# Patient Record
Sex: MALE | Race: WHITE | HISPANIC OR LATINO
[De-identification: names, ages, dates, MRNs, and addresses within clinical notes are randomized per-mention and may not be internally consistent; named-entity substitution may affect disease eponyms.]

---

## 2017-06-12 ENCOUNTER — PRIOR ORIGINAL RECORDS (OUTPATIENT)
Dept: OTHER | Age: 62
End: 2017-06-12

## 2017-07-17 ENCOUNTER — HOSPITAL ENCOUNTER (OUTPATIENT)
Dept: CV DIAGNOSTICS | Facility: HOSPITAL | Age: 62
Discharge: HOME | End: 2017-07-17
Attending: INTERNAL MEDICINE

## 2017-07-17 ENCOUNTER — MYAURORA ACCOUNT LINK (OUTPATIENT)
Dept: OTHER | Age: 62
End: 2017-07-17

## 2017-07-17 DIAGNOSIS — I42.0 CARDIOMYOPATHY, DILATED (HCC): ICD-10-CM

## 2017-07-17 DIAGNOSIS — I10 BENIGN HYPERTENSION: ICD-10-CM

## 2017-07-19 ENCOUNTER — PRIOR ORIGINAL RECORDS (OUTPATIENT)
Dept: OTHER | Age: 62
End: 2017-07-19

## 2018-09-13 ENCOUNTER — PRIOR ORIGINAL RECORDS (OUTPATIENT)
Dept: OTHER | Age: 63
End: 2018-09-13

## 2018-09-14 ENCOUNTER — PRIOR ORIGINAL RECORDS (OUTPATIENT)
Dept: OTHER | Age: 63
End: 2018-09-14

## 2018-10-06 ENCOUNTER — PRIOR ORIGINAL RECORDS (OUTPATIENT)
Dept: OTHER | Age: 63
End: 2018-10-06

## 2018-10-06 ENCOUNTER — LAB ENCOUNTER (OUTPATIENT)
Dept: LAB | Age: 63
End: 2018-10-06
Attending: INTERNAL MEDICINE
Payer: COMMERCIAL

## 2018-10-06 DIAGNOSIS — E78.00 PURE HYPERCHOLESTEROLEMIA: ICD-10-CM

## 2018-10-06 DIAGNOSIS — I10 ESSENTIAL HYPERTENSION, MALIGNANT: Primary | ICD-10-CM

## 2018-10-06 PROCEDURE — 80061 LIPID PANEL: CPT

## 2018-10-06 PROCEDURE — 36415 COLL VENOUS BLD VENIPUNCTURE: CPT

## 2018-10-06 PROCEDURE — 83735 ASSAY OF MAGNESIUM: CPT

## 2018-10-06 PROCEDURE — 80053 COMPREHEN METABOLIC PANEL: CPT

## 2018-10-09 ENCOUNTER — MYAURORA ACCOUNT LINK (OUTPATIENT)
Dept: OTHER | Age: 63
End: 2018-10-09

## 2018-10-09 ENCOUNTER — HOSPITAL ENCOUNTER (OUTPATIENT)
Dept: CV DIAGNOSTICS | Facility: HOSPITAL | Age: 63
Discharge: HOME | End: 2018-10-09
Attending: INTERNAL MEDICINE

## 2018-10-09 DIAGNOSIS — I47.2 VENTRICULAR TACHYARRHYTHMIA (HCC): ICD-10-CM

## 2018-10-10 ENCOUNTER — PRIOR ORIGINAL RECORDS (OUTPATIENT)
Dept: OTHER | Age: 63
End: 2018-10-10

## 2018-10-24 ENCOUNTER — MYAURORA ACCOUNT LINK (OUTPATIENT)
Dept: OTHER | Age: 63
End: 2018-10-24

## 2018-10-24 ENCOUNTER — PRIOR ORIGINAL RECORDS (OUTPATIENT)
Dept: OTHER | Age: 63
End: 2018-10-24

## 2018-10-25 LAB
ALBUMIN: 4.1 G/DL
ALKALINE PHOSPHATATE(ALK PHOS): 44 IU/L
BILIRUBIN TOTAL: 1.3 MG/DL
BUN: 19 MG/DL
CALCIUM: 8.8 MG/DL
CHLORIDE: 105 MEQ/L
CHOLESTEROL, TOTAL: 187 MG/DL
CREATININE, SERUM: 0.8 MG/DL
GLOBULIN: 3.5 G/DL
GLUCOSE: 87 MG/DL
HDL CHOLESTEROL: 57 MG/DL
LDL CHOLESTEROL: 117 MG/DL
MAGNESIUM: 2 MG/DL
POTASSIUM, SERUM: 4.1 MEQ/L
PROTEIN, TOTAL: 7.6 G/DL
SGOT (AST): 19 IU/L
SGPT (ALT): 29 IU/L
SODIUM: 137 MEQ/L
TRIGLYCERIDES: 66 MG/DL

## 2019-02-20 ENCOUNTER — PRIOR ORIGINAL RECORDS (OUTPATIENT)
Dept: OTHER | Age: 64
End: 2019-02-20

## 2019-02-21 ENCOUNTER — ORDER TRANSCRIPTION (OUTPATIENT)
Dept: ADMINISTRATIVE | Facility: HOSPITAL | Age: 64
End: 2019-02-21

## 2019-02-21 DIAGNOSIS — I42.9 CARDIOMYOPATHY (HCC): ICD-10-CM

## 2019-02-21 DIAGNOSIS — R06.02 SOB (SHORTNESS OF BREATH): Primary | ICD-10-CM

## 2019-02-22 ENCOUNTER — ORDER TRANSCRIPTION (OUTPATIENT)
Dept: ADMINISTRATIVE | Facility: HOSPITAL | Age: 64
End: 2019-02-22

## 2019-02-22 DIAGNOSIS — I42.9 CARDIOMYOPATHY (HCC): ICD-10-CM

## 2019-02-22 DIAGNOSIS — R06.02 SOB (SHORTNESS OF BREATH): Primary | ICD-10-CM

## 2019-02-28 VITALS
HEIGHT: 73 IN | BODY MASS INDEX: 32.07 KG/M2 | RESPIRATION RATE: 14 BRPM | DIASTOLIC BLOOD PRESSURE: 102 MMHG | HEART RATE: 90 BPM | SYSTOLIC BLOOD PRESSURE: 160 MMHG | WEIGHT: 242 LBS

## 2019-02-28 VITALS
DIASTOLIC BLOOD PRESSURE: 94 MMHG | HEART RATE: 84 BPM | BODY MASS INDEX: 32.2 KG/M2 | HEIGHT: 73 IN | SYSTOLIC BLOOD PRESSURE: 156 MMHG | WEIGHT: 243 LBS

## 2019-02-28 VITALS — SYSTOLIC BLOOD PRESSURE: 166 MMHG | DIASTOLIC BLOOD PRESSURE: 108 MMHG | HEART RATE: 70 BPM

## 2019-03-01 VITALS
DIASTOLIC BLOOD PRESSURE: 108 MMHG | HEIGHT: 73 IN | SYSTOLIC BLOOD PRESSURE: 148 MMHG | HEART RATE: 90 BPM | WEIGHT: 261 LBS | BODY MASS INDEX: 34.59 KG/M2

## 2019-03-04 ENCOUNTER — LAB ENCOUNTER (OUTPATIENT)
Dept: LAB | Age: 64
End: 2019-03-04
Attending: INTERNAL MEDICINE
Payer: COMMERCIAL

## 2019-03-04 DIAGNOSIS — Z45.02: ICD-10-CM

## 2019-03-04 DIAGNOSIS — I42.0 CONGESTIVE CARDIOMYOPATHY (HCC): Primary | ICD-10-CM

## 2019-03-04 DIAGNOSIS — R06.00 DYSPNEA, PAROXYSMAL NOCTURNAL: ICD-10-CM

## 2019-03-04 LAB
ANION GAP SERPL CALC-SCNC: 6 MMOL/L (ref 0–18)
BUN BLD-MCNC: 17 MG/DL (ref 7–18)
BUN/CREAT SERPL: 16.3 (ref 10–20)
CALCIUM BLD-MCNC: 9.2 MG/DL (ref 8.5–10.1)
CHLORIDE SERPL-SCNC: 104 MMOL/L (ref 98–107)
CO2 SERPL-SCNC: 29 MMOL/L (ref 21–32)
CREAT BLD-MCNC: 1.04 MG/DL (ref 0.7–1.3)
GLUCOSE BLD-MCNC: 88 MG/DL (ref 70–99)
HAV IGM SER QL: 2.1 MG/DL (ref 1.6–2.6)
OSMOLALITY SERPL CALC.SUM OF ELEC: 289 MOSM/KG (ref 275–295)
POTASSIUM SERPL-SCNC: 4.2 MMOL/L (ref 3.5–5.1)
SODIUM SERPL-SCNC: 139 MMOL/L (ref 136–145)
TSI SER-ACNC: 2.74 MIU/ML (ref 0.36–3.74)

## 2019-03-04 PROCEDURE — 84443 ASSAY THYROID STIM HORMONE: CPT

## 2019-03-04 PROCEDURE — 83735 ASSAY OF MAGNESIUM: CPT

## 2019-03-04 PROCEDURE — 36415 COLL VENOUS BLD VENIPUNCTURE: CPT

## 2019-03-04 PROCEDURE — 80048 BASIC METABOLIC PNL TOTAL CA: CPT

## 2019-03-08 ENCOUNTER — HOSPITAL ENCOUNTER (OUTPATIENT)
Dept: CT IMAGING | Facility: HOSPITAL | Age: 64
Discharge: HOME | End: 2019-03-08
Attending: INTERNAL MEDICINE
Payer: COMMERCIAL

## 2019-03-08 ENCOUNTER — TELEPHONE (OUTPATIENT)
Dept: CARDIOLOGY | Age: 64
End: 2019-03-08

## 2019-03-08 DIAGNOSIS — I42.9 CARDIOMYOPATHY, SECONDARY (HCC): ICD-10-CM

## 2019-03-08 DIAGNOSIS — I42.9 CARDIOMYOPATHY (HCC): ICD-10-CM

## 2019-03-08 DIAGNOSIS — R06.02 SOB (SHORTNESS OF BREATH): ICD-10-CM

## 2019-03-08 PROCEDURE — 75574 CT ANGIO HRT W/3D IMAGE: CPT | Performed by: INTERNAL MEDICINE

## 2019-03-08 RX ORDER — NITROGLYCERIN 0.4 MG/1
TABLET SUBLINGUAL
Status: DISPENSED
Start: 2019-03-08 | End: 2019-03-09

## 2019-03-10 RX ORDER — SPIRONOLACTONE 25 MG/1
1 TABLET ORAL DAILY
COMMUNITY
Start: 2018-11-07 | End: 2019-08-03 | Stop reason: SDUPTHER

## 2019-03-10 RX ORDER — METOPROLOL TARTRATE 100 MG/1
TABLET ORAL
COMMUNITY
Start: 2019-02-20 | End: 2019-10-15 | Stop reason: ALTCHOICE

## 2019-03-10 RX ORDER — SOTALOL HYDROCHLORIDE 80 MG/1
1 TABLET ORAL 2 TIMES DAILY
COMMUNITY
Start: 2019-02-20 | End: 2019-08-03 | Stop reason: SDUPTHER

## 2019-03-10 RX ORDER — CARVEDILOL 25 MG/1
1 TABLET ORAL 2 TIMES DAILY
COMMUNITY
Start: 2018-11-07 | End: 2020-01-01 | Stop reason: SDUPTHER

## 2019-03-10 RX ORDER — LISINOPRIL 20 MG/1
1 TABLET ORAL 2 TIMES DAILY
COMMUNITY
Start: 2018-11-07 | End: 2020-01-01 | Stop reason: SDUPTHER

## 2019-03-10 RX ORDER — TADALAFIL 5 MG/1
TABLET ORAL
COMMUNITY
Start: 2018-11-07 | End: 2019-12-22 | Stop reason: SDUPTHER

## 2019-03-20 ENCOUNTER — OFFICE VISIT (OUTPATIENT)
Dept: CARDIOLOGY | Age: 64
End: 2019-03-20

## 2019-03-20 ENCOUNTER — ANCILLARY PROCEDURE (OUTPATIENT)
Dept: CARDIOLOGY | Age: 64
End: 2019-03-20
Attending: INTERNAL MEDICINE

## 2019-03-20 VITALS
SYSTOLIC BLOOD PRESSURE: 160 MMHG | BODY MASS INDEX: 34.27 KG/M2 | HEART RATE: 79 BPM | DIASTOLIC BLOOD PRESSURE: 96 MMHG | HEIGHT: 72 IN | WEIGHT: 253 LBS

## 2019-03-20 VITALS
WEIGHT: 253 LBS | DIASTOLIC BLOOD PRESSURE: 96 MMHG | HEIGHT: 72 IN | HEART RATE: 79 BPM | SYSTOLIC BLOOD PRESSURE: 160 MMHG | BODY MASS INDEX: 34.27 KG/M2

## 2019-03-20 DIAGNOSIS — I10 ESSENTIAL HYPERTENSION: ICD-10-CM

## 2019-03-20 DIAGNOSIS — I42.0 CARDIOMYOPATHY, DILATED, NONISCHEMIC (CMD): Primary | ICD-10-CM

## 2019-03-20 DIAGNOSIS — I47.20 VENTRICULAR TACHYCARDIA (CMD): Chronic | ICD-10-CM

## 2019-03-20 DIAGNOSIS — Z95.810 ICD (IMPLANTABLE CARDIOVERTER-DEFIBRILLATOR) IN PLACE: Primary | ICD-10-CM

## 2019-03-20 DIAGNOSIS — Z95.810 ICD (IMPLANTABLE CARDIOVERTER-DEFIBRILLATOR) IN PLACE: ICD-10-CM

## 2019-03-20 PROCEDURE — 99215 OFFICE O/P EST HI 40 MIN: CPT | Performed by: INTERNAL MEDICINE

## 2019-03-20 PROCEDURE — 3077F SYST BP >= 140 MM HG: CPT | Performed by: INTERNAL MEDICINE

## 2019-03-20 PROCEDURE — 93289 INTERROG DEVICE EVAL HEART: CPT | Performed by: INTERNAL MEDICINE

## 2019-03-20 PROCEDURE — 93000 ELECTROCARDIOGRAM COMPLETE: CPT | Performed by: INTERNAL MEDICINE

## 2019-03-20 PROCEDURE — 3080F DIAST BP >= 90 MM HG: CPT | Performed by: INTERNAL MEDICINE

## 2019-03-20 PROCEDURE — 99213 OFFICE O/P EST LOW 20 MIN: CPT | Performed by: INTERNAL MEDICINE

## 2019-03-20 SDOH — HEALTH STABILITY: MENTAL HEALTH: HOW OFTEN DO YOU HAVE A DRINK CONTAINING ALCOHOL?: NEVER

## 2019-03-20 ASSESSMENT — ENCOUNTER SYMPTOMS
CHILLS: 0
ALLERGIC/IMMUNOLOGIC COMMENTS: NO NEW FOOD ALLERGIES
HEMOPTYSIS: 0
SUSPICIOUS LESIONS: 0
HEMOPTYSIS: 0
COUGH: 0
HEMATOCHEZIA: 0
WEIGHT LOSS: 0
ALLERGIC/IMMUNOLOGIC COMMENTS: NO NEW FOOD ALLERGIES
FEVER: 0
BRUISES/BLEEDS EASILY: 0
COUGH: 0
HEMATOCHEZIA: 0
WEIGHT LOSS: 0
WEIGHT GAIN: 0
SUSPICIOUS LESIONS: 0
BRUISES/BLEEDS EASILY: 0
FEVER: 0
WEIGHT GAIN: 0
CHILLS: 0

## 2019-03-27 ENCOUNTER — TELEPHONE (OUTPATIENT)
Dept: CARDIOLOGY | Age: 64
End: 2019-03-27

## 2019-06-04 ENCOUNTER — APPOINTMENT (OUTPATIENT)
Dept: CARDIOLOGY | Age: 64
End: 2019-06-04
Attending: INTERNAL MEDICINE

## 2019-08-05 RX ORDER — SPIRONOLACTONE 25 MG/1
TABLET ORAL
Qty: 90 TABLET | Refills: 0 | Status: SHIPPED | OUTPATIENT
Start: 2019-08-05 | End: 2019-10-18 | Stop reason: SDUPTHER

## 2019-08-05 RX ORDER — SOTALOL HYDROCHLORIDE 80 MG/1
TABLET ORAL
Qty: 180 TABLET | Refills: 0 | Status: SHIPPED | OUTPATIENT
Start: 2019-08-05 | End: 2019-10-18 | Stop reason: SDUPTHER

## 2019-10-18 RX ORDER — SPIRONOLACTONE 25 MG/1
TABLET ORAL
Qty: 90 TABLET | Refills: 1 | Status: SHIPPED | OUTPATIENT
Start: 2019-10-18 | End: 2020-03-16

## 2019-10-18 RX ORDER — SOTALOL HYDROCHLORIDE 80 MG/1
TABLET ORAL
Qty: 60 TABLET | Refills: 0 | Status: SHIPPED | OUTPATIENT
Start: 2019-10-18 | End: 2020-04-15 | Stop reason: SDUPTHER

## 2019-12-08 ENCOUNTER — APPOINTMENT (OUTPATIENT)
Dept: GENERAL RADIOLOGY | Age: 64
DRG: 308 | End: 2019-12-08
Attending: EMERGENCY MEDICINE
Payer: COMMERCIAL

## 2019-12-08 ENCOUNTER — HOSPITAL ENCOUNTER (INPATIENT)
Facility: HOSPITAL | Age: 64
LOS: 2 days | Discharge: HOME | DRG: 308 | End: 2019-12-10
Attending: EMERGENCY MEDICINE | Admitting: INTERNAL MEDICINE
Payer: COMMERCIAL

## 2019-12-08 DIAGNOSIS — J18.9 COMMUNITY ACQUIRED PNEUMONIA OF RIGHT LUNG, UNSPECIFIED PART OF LUNG: Primary | ICD-10-CM

## 2019-12-08 DIAGNOSIS — I48.91 ATRIAL FIBRILLATION WITH RAPID VENTRICULAR RESPONSE (HCC): ICD-10-CM

## 2019-12-08 PROBLEM — R73.9 HYPERGLYCEMIA: Status: ACTIVE | Noted: 2019-12-08

## 2019-12-08 PROCEDURE — 99253 IP/OBS CNSLTJ NEW/EST LOW 45: CPT | Performed by: INTERNAL MEDICINE

## 2019-12-08 PROCEDURE — 99223 1ST HOSP IP/OBS HIGH 75: CPT | Performed by: INTERNAL MEDICINE

## 2019-12-08 PROCEDURE — 71046 X-RAY EXAM CHEST 2 VIEWS: CPT | Performed by: EMERGENCY MEDICINE

## 2019-12-08 RX ORDER — HEPARIN SODIUM AND DEXTROSE 10000; 5 [USP'U]/100ML; G/100ML
INJECTION INTRAVENOUS CONTINUOUS
Status: DISCONTINUED | OUTPATIENT
Start: 2019-12-08 | End: 2019-12-10

## 2019-12-08 RX ORDER — ASPIRIN 81 MG/1
324 TABLET, CHEWABLE ORAL ONCE
Status: COMPLETED | OUTPATIENT
Start: 2019-12-08 | End: 2019-12-08

## 2019-12-08 RX ORDER — LISINOPRIL 20 MG/1
20 TABLET ORAL DAILY
Status: DISCONTINUED | OUTPATIENT
Start: 2019-12-09 | End: 2019-12-10

## 2019-12-08 RX ORDER — ONDANSETRON 2 MG/ML
4 INJECTION INTRAMUSCULAR; INTRAVENOUS EVERY 6 HOURS PRN
Status: DISCONTINUED | OUTPATIENT
Start: 2019-12-08 | End: 2019-12-10

## 2019-12-08 RX ORDER — METOCLOPRAMIDE HYDROCHLORIDE 5 MG/ML
10 INJECTION INTRAMUSCULAR; INTRAVENOUS EVERY 8 HOURS PRN
Status: DISCONTINUED | OUTPATIENT
Start: 2019-12-08 | End: 2019-12-10

## 2019-12-08 RX ORDER — HEPARIN SODIUM 5000 [USP'U]/ML
5000 INJECTION INTRAVENOUS; SUBCUTANEOUS ONCE
Status: COMPLETED | OUTPATIENT
Start: 2019-12-08 | End: 2019-12-08

## 2019-12-08 RX ORDER — SPIRONOLACTONE 25 MG/1
25 TABLET ORAL DAILY
Status: DISCONTINUED | OUTPATIENT
Start: 2019-12-09 | End: 2019-12-10

## 2019-12-08 RX ORDER — SODIUM CHLORIDE 9 MG/ML
INJECTION, SOLUTION INTRAVENOUS CONTINUOUS
Status: ACTIVE | OUTPATIENT
Start: 2019-12-08 | End: 2019-12-08

## 2019-12-08 RX ORDER — SODIUM CHLORIDE 9 MG/ML
INJECTION, SOLUTION INTRAVENOUS ONCE
Status: COMPLETED | OUTPATIENT
Start: 2019-12-08 | End: 2019-12-08

## 2019-12-08 RX ORDER — HEPARIN SODIUM AND DEXTROSE 10000; 5 [USP'U]/100ML; G/100ML
1000 INJECTION INTRAVENOUS ONCE
Status: COMPLETED | OUTPATIENT
Start: 2019-12-08 | End: 2019-12-08

## 2019-12-08 NOTE — PROGRESS NOTES
Orthostatic vitals       12/08/19 1642 12/08/19 1644 12/08/19 1645   Vital Signs   /86 147/89 (!) 145/99   BP Location Left arm Left arm Left arm   BP Method Automatic Automatic Automatic   Patient Position Lying Sitting Sitting

## 2019-12-08 NOTE — ED PROVIDER NOTES
Patient Seen in: [address] [facility] In [place]      History   Patient presents with:  Cough/URI    Stated Complaint: cough - uri    HPI    The patient is a 47-year-old male who presents emergency room with a history of cough and cold symptoms breathing easily in no apparent distress. Patient is nontoxic in appearance. HEENT: Head is normocephalic, atraumatic. Pupils are 4 mm equally round and reactive to light. Oropharynx is clear. Mucous membranes are somewhat dry.   NECK: There is no focal t -----------         ------                     CBC W/ DIFFERENTIAL[[id number]]                              Final result                 Please view results for these tests on the individual orders.    BLOOD CULTURE   BLOOD CULTURE   CBC W/ DIFFERENTIAL heart rate after this was given. The patient was given IV Rocephin in the emergency room. Patient's lactic acid is normal.  The patient was given IV fluid and IV Cardizem.   Patient given IV heparin after discussion with Dr. Rosio Ruiz who personally reviewed

## 2019-12-09 ENCOUNTER — APPOINTMENT (OUTPATIENT)
Dept: CV DIAGNOSTICS | Facility: HOSPITAL | Age: 64
DRG: 308 | End: 2019-12-09
Attending: INTERNAL MEDICINE
Payer: COMMERCIAL

## 2019-12-09 PROCEDURE — 93306 TTE W/DOPPLER COMPLETE: CPT | Performed by: INTERNAL MEDICINE

## 2019-12-09 PROCEDURE — 99233 SBSQ HOSP IP/OBS HIGH 50: CPT | Performed by: INTERNAL MEDICINE

## 2019-12-09 PROCEDURE — 99232 SBSQ HOSP IP/OBS MODERATE 35: CPT | Performed by: INTERNAL MEDICINE

## 2019-12-09 RX ORDER — DILTIAZEM HYDROCHLORIDE 5 MG/ML
10 INJECTION INTRAVENOUS EVERY 2 HOUR PRN
Status: DISCONTINUED | OUTPATIENT
Start: 2019-12-09 | End: 2019-12-10

## 2019-12-09 RX ORDER — METOPROLOL SUCCINATE 25 MG/1
25 TABLET, EXTENDED RELEASE ORAL
Status: DISCONTINUED | OUTPATIENT
Start: 2019-12-09 | End: 2019-12-09

## 2019-12-09 RX ORDER — METOPROLOL TARTRATE 50 MG/1
50 TABLET, FILM COATED ORAL ONCE
Status: COMPLETED | OUTPATIENT
Start: 2019-12-09 | End: 2019-12-09

## 2019-12-09 RX ORDER — METOPROLOL SUCCINATE 50 MG/1
50 TABLET, EXTENDED RELEASE ORAL
Status: DISCONTINUED | OUTPATIENT
Start: 2019-12-09 | End: 2019-12-10

## 2019-12-09 NOTE — CONSULTS
CARDIOLOGY SERVICE CONSULT      Patient: Brooke Braxton Date: 2019     : 1955 Attending: Elizabeth Calero DO   60year old male Requested by: Dr. Helane Closs     A/P:  Dyspnea  Community Acquired Pneumonia  AF RVR  NICM with LV recovery s/p CRT-D  VT Smokeless tobacco: Never Used    Substance and Sexual Activity      Alcohol use: No      Drug use: No      Sexual activity: Not on file    Lifestyle      Physical activity:        Days per week: Not on file        Minutes per session: Not on file      [facility] 245 lb (111.1 kg)   Height N/A Height: 6' 1\" (185.4 cm)   BMI (body mass index) N/A BMI (Calculated): 32.3       Weight over the past 48 Hours:  Patient Vitals for the past 48 hrs:   Weight   12/08/19 1305 245 lb (111.1 kg)   12/08/19 1817 257 lb 8 oz (11 interval change. CXR 12/8/19  FINDINGS:    LUNGS:  Small amount of infiltrate within the right middle lobe seen best on the lateral view suggesting pneumonia. The left lung is clear. CARDIAC:  The heart size is normal.  Stable pacemaker leads.   Left-s

## 2019-12-09 NOTE — PLAN OF CARE
Received patient at 0730. Alert and Oriented x4. Tele Rhythm Afib rate . O2 saturation 96% On room air. Breath sounds diminished but clear, productive cough noted, pt states sputum color better today. Bed is locked and in low position.  Call light an injury  Description  INTERVENTIONS:  - Assess pt frequently for physical needs  - Identify cognitive and physical deficits and behaviors that affect risk of falls.   - [place] fall precautions as indicated by assessment.  - Educate pt/family on patient sa

## 2019-12-09 NOTE — PROGRESS NOTES
[facility]  Cardiology Progress Note    Subjective:  No chest pain or shortness of breath. Afib persists. Apparently diltiazem infusion stopped over night due to rates in 70's.   Toprol started in place of Coreg - rates poorly controlled at present 1 the     sinus of Valsalva. 4. Mitral valve: Mildly calcified annulus. 5. Left atrium: The left atrium was dilated. The left atrial volume was     mildly to moderately increased.     Impressions: This study is compared with previous dated 10/09/2018:  [address] Lung exam essentially is clear.   His heart rhythm is irregular by exam.    Supa Saenz MD [facility]

## 2019-12-09 NOTE — PLAN OF CARE
Aox4, room air, no complaints of chest pain or SOB, Afib on tele, HR low 100's, cardizem 5mg/hr, heparin 10 mL/hr, VSS, IV rocephin and IV zithromax, safety precautions, call light within reach, patient updated with POC, will continue to monitor the pt. hemodynamic stability  - Monitor arterial and/or venous puncture sites for bleeding and/or hematoma  - Assess quality of pulses, skin color and temperature  - Assess for signs of decreased coronary artery perfusion - ex.  Angina  - Evaluate fluid balance, a

## 2019-12-09 NOTE — PLAN OF CARE
Pt AOx4. On RA, denies SOB. Afib on tele, rates controlled, occasional Vpaced beats, occ PVCS. Heparin gtt infusing per ACS/Afib protocol. Cardizem gtt infusing @ 5 mg/hr. Pt denies chest pain/palpitations. IV abx, afebrile.  Plan for echo and labs in am. P signs, rhythm, and trends  - Monitor for bleeding, hypotension and signs of decreased cardiac output  - Evaluate effectiveness of vasoactive medications to optimize hemodynamic stability  - Monitor arterial and/or venous puncture sites for bleeding and/or

## 2019-12-09 NOTE — H&P
[facility]  History and Physical     Edriki Hickey Patient Status:  Inpatient    1955 MRN [id number]   [facility] 2NE-A Attending Pascual Moreno, [address] [facility] Day # 0 PCP Janice Johnson MD     Chief Complaint: dyspnea    History with meals. , Disp: , Rfl:   spironolactone (ALDACTONE) 25 MG Oral Tab, Take 25 mg by mouth daily. , Disp: , Rfl:   lisinopril (PRINIVIL,ZESTRIL) 20 MG Oral Tab, Take 20 mg by mouth daily. , Disp: , Rfl:         Review of Systems:   A comprehensive 14 point r following  2. Community Acquired RML PNA  1. Ceftriaxone/Azithromcyin  3. Dilated Cardiomyopathy s/p ICD  1. Aldactone/Lisinopril  2. euvolemic and not on any diuretics  4. LBBB - new? - ECHO    Quality:  · DVT Prophylaxis: Heparin gtt  · CODE status:  Full

## 2019-12-09 NOTE — PROGRESS NOTES
[facility]  Progress Note     Pepper Grim Patient Status:  Inpatient    1955 MRN [id number]   [facility] 2NE-A Attending Katherine Jackson, DO   Hosp Day # 1 PCP Maren Scheuermann, MD     Chief Complaint: Dyspnea    S: Patient states cefTRIAXone  2 g Intravenous Q24H   • azithromycin  500 mg Intravenous Q24H       ASSESSMENT / PLAN:     1. Atrial Fibrillation with RVR  1. On heparin gtt for A/C  2. Beta blockade w/Metoprolol, Cardiology following and adjusting  3.  PM to be interrogated

## 2019-12-10 VITALS
HEIGHT: 73 IN | SYSTOLIC BLOOD PRESSURE: 125 MMHG | DIASTOLIC BLOOD PRESSURE: 88 MMHG | TEMPERATURE: 99 F | HEART RATE: 98 BPM | WEIGHT: 257.5 LBS | RESPIRATION RATE: 20 BRPM | BODY MASS INDEX: 34.13 KG/M2 | OXYGEN SATURATION: 100 %

## 2019-12-10 PROCEDURE — 99239 HOSP IP/OBS DSCHRG MGMT >30: CPT | Performed by: INTERNAL MEDICINE

## 2019-12-10 PROCEDURE — 99233 SBSQ HOSP IP/OBS HIGH 50: CPT | Performed by: INTERNAL MEDICINE

## 2019-12-10 PROCEDURE — 4B02XTZ MEASUREMENT OF CARDIAC DEFIBRILLATOR, EXTERNAL APPROACH: ICD-10-PCS | Performed by: INTERNAL MEDICINE

## 2019-12-10 RX ORDER — CARVEDILOL 12.5 MG/1
25 TABLET ORAL 2 TIMES DAILY WITH MEALS
Status: DISCONTINUED | OUTPATIENT
Start: 2019-12-10 | End: 2019-12-10

## 2019-12-10 RX ORDER — CARVEDILOL 12.5 MG/1
12.5 TABLET ORAL ONCE
Status: DISCONTINUED | OUTPATIENT
Start: 2019-12-10 | End: 2019-12-10

## 2019-12-10 RX ORDER — DIGOXIN 125 MCG
125 TABLET ORAL DAILY
Status: DISCONTINUED | OUTPATIENT
Start: 2019-12-10 | End: 2019-12-10

## 2019-12-10 RX ORDER — AMOXICILLIN AND CLAVULANATE POTASSIUM 875; 125 MG/1; MG/1
1 TABLET, FILM COATED ORAL 2 TIMES DAILY
Qty: 10 TABLET | Refills: 0 | Status: SHIPPED | OUTPATIENT
Start: 2019-12-10 | End: 2019-12-15

## 2019-12-10 RX ORDER — DILTIAZEM HYDROCHLORIDE 60 MG/1
60 TABLET, FILM COATED ORAL EVERY 6 HOURS SCHEDULED
Status: DISCONTINUED | OUTPATIENT
Start: 2019-12-10 | End: 2019-12-10

## 2019-12-10 RX ORDER — DIGOXIN 125 MCG
125 TABLET ORAL DAILY
Qty: 30 TABLET | Refills: 3 | Status: SHIPPED
Start: 2019-12-10

## 2019-12-10 RX ORDER — DIGOXIN 0.25 MG/ML
250 INJECTION INTRAMUSCULAR; INTRAVENOUS ONCE
Status: COMPLETED | OUTPATIENT
Start: 2019-12-10 | End: 2019-12-10

## 2019-12-10 RX ORDER — METOPROLOL SUCCINATE 100 MG/1
100 TABLET, EXTENDED RELEASE ORAL
Status: DISCONTINUED | OUTPATIENT
Start: 2019-12-10 | End: 2019-12-10

## 2019-12-10 NOTE — PAYOR COMM NOTE
--------------  CONTINUED STAY REVIEW    Payor: [facility]  Subscriber #:  [id number]  Authorization Number: [id number]     Admit date: 12/8/19  Admit time: 5    Admitting Physician: Pascual Moreno DO  Attending Physician:  Pascual Moreno DO

## 2019-12-10 NOTE — PLAN OF CARE
Received patient at 0730. Alert and Orientated x4. Tele Rhythm afib rate . Vpaced at times. O2 saturation 100% on RA. Breath sounds diminished. Bed is locked and in low position. Call light and personal items within reach. No C/O chest pain or SOB.  P frequently for physical needs  - Identify cognitive and physical deficits and behaviors that affect risk of falls.   - [place] fall precautions as indicated by assessment.  - Educate pt/family on patient safety including physical limitations  - Instruct p

## 2019-12-10 NOTE — CM/SW NOTE
[employer] regarding coverage for apixaban for pt with new afib. Per pharmacy copay for pt is: $45/month  $10 copay/month card provided by Eliquis which pt can use for savings.  Pt notified to present this at the pharmacy on  for scr

## 2019-12-10 NOTE — PLAN OF CARE
Pt denies c/o pain. A/Ox4. VSS. Breath sounds clear but diminished in bases. Afib on telemetry. V-paced. IV Heparin infusing at 14ml/hr. HR 80-110s at rest. Bowel sounds active in four quadrants. Voids in bathroom. Call light within reach.  Will continue as indicated by assessment.  - Educate pt/family on patient safety including physical limitations  - Instruct pt to call for assistance with activity based on assessment  - Modify environment to reduce risk of injury  - Provide assistive devices as appropr

## 2019-12-10 NOTE — DISCHARGE SUMMARY
[facility] [place] [facility]  DISCHARGE SUMMARY     Sally Aguillon Patient Status:  Inpatient    1955 MRN [id number]   [facility] 2NE-A Attending Anaya Meyer, DO   Hosp Day # 2 PCP Armin Simpson MD     Date of Admission: 2019  Date of Dis f/u with Cardiology, may nee outpatient cardioversion after 4 weeks of anticoagulation    Procedures during hospitalization:   • n/a    Incidental or significant findings and recommendations (brief descriptions):  • n/a    Lab/Test results pending at [facility] (37 °C)] 98.6 °F (37 °C)  Pulse:  [] 98  Resp:  [18-20] 20  BP: (125-160)/() 125/88    Physical Exam:    General: No acute distress. Respiratory: Clear to auscultation bilaterally. No wheezes. No rhonchi.   Cardiovascular: IRR, mildly and inte

## 2019-12-10 NOTE — PROGRESS NOTES
[facility]  Cardiology Progress Note    Subjective:  No chest pain or shortness of breath. Afib remains uncontrolled.       Objective:  BP (!) 160/102 (BP Location: Left arm)   Pulse 104   Temp 98.1 °F (36.7 °C) (Oral)   Resp 20   Ht 6' 1\" (1.854 m) with 1 week of cough and diagnosed with PNA. Also found to have afib with RVR. Device interrogation shows that has been in afib for the past 2 days. No recent VT episodes. Started on IV heparin. HRs today still elevated in the 120s    Plan:  1.   Resta

## 2019-12-11 NOTE — PLAN OF CARE
NURSING DISCHARGE NOTE    Discharged Home via Wheelchair. Accompanied by Family member  Belongings Taken by patient/family. Pt OK to go home per cardiology and primary. HR 70-90 at rest and  with activity. Still Afib.  IV abx  doses given for

## 2019-12-11 NOTE — PAYOR COMM NOTE
--------------  DISCHARGE REVIEW    Payor: [facility]  Subscriber #:  [id number]  Authorization Number: [id number]     Admit date: 12/8/19  Admit time:  1626  Discharge Date: 12/10/2019  6:29 PM     Admitting Physician: DO Angel Reed

## 2019-12-22 ENCOUNTER — TELEPHONE (OUTPATIENT)
Dept: CARDIOLOGY | Age: 64
End: 2019-12-22

## 2019-12-24 ENCOUNTER — TELEPHONE (OUTPATIENT)
Dept: CARDIOLOGY | Age: 64
End: 2019-12-24

## 2019-12-27 RX ORDER — TADALAFIL 5 MG
TABLET ORAL
Qty: 30 TABLET | Refills: 0 | Status: SHIPPED | OUTPATIENT
Start: 2019-12-27

## 2020-01-02 RX ORDER — LISINOPRIL 20 MG/1
TABLET ORAL
Qty: 180 TABLET | Refills: 3 | Status: SHIPPED | OUTPATIENT
Start: 2020-01-02 | End: 2020-12-21

## 2020-01-02 RX ORDER — CARVEDILOL 25 MG/1
TABLET ORAL
Qty: 180 TABLET | Refills: 3 | Status: SHIPPED | OUTPATIENT
Start: 2020-01-02 | End: 2020-12-21

## 2020-01-15 ENCOUNTER — OFFICE VISIT (OUTPATIENT)
Dept: CARDIOLOGY | Age: 65
End: 2020-01-15

## 2020-01-15 VITALS
HEART RATE: 68 BPM | BODY MASS INDEX: 35.62 KG/M2 | HEIGHT: 72 IN | WEIGHT: 263 LBS | DIASTOLIC BLOOD PRESSURE: 90 MMHG | SYSTOLIC BLOOD PRESSURE: 140 MMHG

## 2020-01-15 DIAGNOSIS — I47.20 VENTRICULAR TACHYCARDIA (CMD): Primary | Chronic | ICD-10-CM

## 2020-01-15 DIAGNOSIS — Z95.810 ICD (IMPLANTABLE CARDIOVERTER-DEFIBRILLATOR) IN PLACE: ICD-10-CM

## 2020-01-15 PROBLEM — Z79.899 ENCOUNTER FOR MONITORING SOTALOL THERAPY: Status: ACTIVE | Noted: 2020-01-15

## 2020-01-15 PROBLEM — Z51.81 ENCOUNTER FOR MONITORING SOTALOL THERAPY: Status: ACTIVE | Noted: 2020-01-15

## 2020-01-15 PROCEDURE — 3077F SYST BP >= 140 MM HG: CPT | Performed by: INTERNAL MEDICINE

## 2020-01-15 PROCEDURE — 99214 OFFICE O/P EST MOD 30 MIN: CPT | Performed by: INTERNAL MEDICINE

## 2020-01-15 PROCEDURE — 93000 ELECTROCARDIOGRAM COMPLETE: CPT | Performed by: INTERNAL MEDICINE

## 2020-01-15 RX ORDER — DIGOXIN 125 MCG
125 TABLET ORAL DAILY
Refills: 3 | COMMUNITY
Start: 2020-01-01 | End: 2020-03-16

## 2020-01-15 RX ORDER — APIXABAN 5 MG/1
5 TABLET, FILM COATED ORAL 2 TIMES DAILY
Refills: 3 | COMMUNITY
Start: 2020-01-01 | End: 2020-03-16

## 2020-01-15 ASSESSMENT — PATIENT HEALTH QUESTIONNAIRE - PHQ9
1. LITTLE INTEREST OR PLEASURE IN DOING THINGS: NOT AT ALL
2. FEELING DOWN, DEPRESSED OR HOPELESS: NOT AT ALL
SUM OF ALL RESPONSES TO PHQ9 QUESTIONS 1 AND 2: 0
SUM OF ALL RESPONSES TO PHQ9 QUESTIONS 1 AND 2: 0

## 2020-03-16 RX ORDER — SPIRONOLACTONE 25 MG/1
TABLET ORAL
Qty: 90 TABLET | Refills: 3 | Status: SHIPPED | OUTPATIENT
Start: 2020-03-16 | End: 2021-01-29

## 2020-03-16 RX ORDER — DIGOXIN 125 MCG
TABLET ORAL
Qty: 90 TABLET | Refills: 3 | Status: SHIPPED | OUTPATIENT
Start: 2020-03-16 | End: 2020-09-14

## 2020-03-16 RX ORDER — APIXABAN 5 MG/1
TABLET, FILM COATED ORAL
Qty: 180 TABLET | Refills: 3 | Status: SHIPPED | OUTPATIENT
Start: 2020-03-16

## 2020-03-23 ENCOUNTER — TELEPHONE (OUTPATIENT)
Dept: CARDIOLOGY | Age: 65
End: 2020-03-23

## 2020-03-24 ENCOUNTER — OFFICE VISIT (OUTPATIENT)
Dept: CARDIOLOGY | Age: 65
End: 2020-03-24

## 2020-03-24 VITALS
BODY MASS INDEX: 32.47 KG/M2 | WEIGHT: 245 LBS | SYSTOLIC BLOOD PRESSURE: 145 MMHG | DIASTOLIC BLOOD PRESSURE: 75 MMHG | HEART RATE: 78 BPM | HEIGHT: 73 IN

## 2020-03-24 DIAGNOSIS — I42.0 CARDIOMYOPATHY, DILATED, NONISCHEMIC (CMD): Primary | ICD-10-CM

## 2020-03-24 DIAGNOSIS — I10 ESSENTIAL HYPERTENSION: ICD-10-CM

## 2020-03-24 DIAGNOSIS — Z86.79 ATRIAL FIBRILLATION, CURRENTLY IN SINUS RHYTHM: Chronic | ICD-10-CM

## 2020-03-24 DIAGNOSIS — Z79.01 ANTICOAGULATED: Chronic | ICD-10-CM

## 2020-03-24 DIAGNOSIS — I47.20 VENTRICULAR TACHYCARDIA (CMD): Chronic | ICD-10-CM

## 2020-03-24 DIAGNOSIS — Z95.810 ICD (IMPLANTABLE CARDIOVERTER-DEFIBRILLATOR) IN PLACE: ICD-10-CM

## 2020-03-24 PROCEDURE — 99214 OFFICE O/P EST MOD 30 MIN: CPT | Performed by: INTERNAL MEDICINE

## 2020-03-24 ASSESSMENT — PATIENT HEALTH QUESTIONNAIRE - PHQ9
2. FEELING DOWN, DEPRESSED OR HOPELESS: NOT AT ALL
SUM OF ALL RESPONSES TO PHQ9 QUESTIONS 1 AND 2: 0
SUM OF ALL RESPONSES TO PHQ9 QUESTIONS 1 AND 2: 0
1. LITTLE INTEREST OR PLEASURE IN DOING THINGS: NOT AT ALL
1. LITTLE INTEREST OR PLEASURE IN DOING THINGS: NOT AT ALL
2. FEELING DOWN, DEPRESSED OR HOPELESS: NOT AT ALL
SUM OF ALL RESPONSES TO PHQ9 QUESTIONS 1 AND 2: 0

## 2020-03-25 ENCOUNTER — APPOINTMENT (OUTPATIENT)
Dept: CARDIOLOGY | Age: 65
End: 2020-03-25

## 2020-04-15 ENCOUNTER — OFFICE VISIT (OUTPATIENT)
Dept: CARDIOLOGY | Age: 65
End: 2020-04-15

## 2020-04-15 VITALS
DIASTOLIC BLOOD PRESSURE: 89 MMHG | WEIGHT: 247 LBS | BODY MASS INDEX: 32.74 KG/M2 | HEIGHT: 73 IN | SYSTOLIC BLOOD PRESSURE: 150 MMHG

## 2020-04-15 DIAGNOSIS — I42.0 CARDIOMYOPATHY, DILATED, NONISCHEMIC (CMD): ICD-10-CM

## 2020-04-15 DIAGNOSIS — Z86.79 ATRIAL FIBRILLATION, CURRENTLY IN SINUS RHYTHM: Chronic | ICD-10-CM

## 2020-04-15 DIAGNOSIS — I47.20 VENTRICULAR TACHYCARDIA (CMD): Primary | Chronic | ICD-10-CM

## 2020-04-15 DIAGNOSIS — I10 ESSENTIAL HYPERTENSION: ICD-10-CM

## 2020-04-15 PROCEDURE — 99214 OFFICE O/P EST MOD 30 MIN: CPT | Performed by: INTERNAL MEDICINE

## 2020-04-15 RX ORDER — SOTALOL HYDROCHLORIDE 80 MG/1
80 TABLET ORAL 2 TIMES DAILY
Qty: 180 TABLET | Refills: 3 | Status: SHIPPED | OUTPATIENT
Start: 2020-04-15

## 2020-06-15 ENCOUNTER — TELEPHONE (OUTPATIENT)
Dept: CARDIOLOGY | Age: 65
End: 2020-06-15

## 2020-06-18 ENCOUNTER — HOSPITAL ENCOUNTER (OUTPATIENT)
Dept: LAB | Facility: HOSPITAL | Age: 65
Discharge: HOME | End: 2020-06-18
Attending: INTERNAL MEDICINE
Payer: COMMERCIAL

## 2020-06-18 PROCEDURE — 80053 COMPREHEN METABOLIC PANEL: CPT | Performed by: INTERNAL MEDICINE

## 2020-06-18 PROCEDURE — 80162 ASSAY OF DIGOXIN TOTAL: CPT | Performed by: INTERNAL MEDICINE

## 2020-06-18 PROCEDURE — 85027 COMPLETE CBC AUTOMATED: CPT | Performed by: INTERNAL MEDICINE

## 2020-06-18 PROCEDURE — 36415 COLL VENOUS BLD VENIPUNCTURE: CPT | Performed by: INTERNAL MEDICINE

## 2020-06-23 ENCOUNTER — TELEPHONE (OUTPATIENT)
Dept: CARDIOLOGY | Age: 65
End: 2020-06-23

## 2020-07-22 ENCOUNTER — APPOINTMENT (OUTPATIENT)
Dept: CARDIOLOGY | Age: 65
End: 2020-07-22

## 2020-07-23 ENCOUNTER — TELEPHONE (OUTPATIENT)
Dept: CARDIOLOGY | Age: 65
End: 2020-07-23

## 2020-09-14 RX ORDER — DIGOXIN 125 MCG
TABLET ORAL
Qty: 90 TABLET | Refills: 3 | Status: SHIPPED | OUTPATIENT
Start: 2020-09-14

## 2020-12-21 RX ORDER — CARVEDILOL 25 MG/1
TABLET ORAL
Qty: 180 TABLET | Refills: 3 | Status: SHIPPED | OUTPATIENT
Start: 2020-12-21

## 2020-12-21 RX ORDER — LISINOPRIL 20 MG/1
TABLET ORAL
Qty: 180 TABLET | Refills: 3 | Status: SHIPPED | OUTPATIENT
Start: 2020-12-21

## 2021-01-29 RX ORDER — SPIRONOLACTONE 25 MG/1
TABLET ORAL
Qty: 90 TABLET | Refills: 3 | Status: SHIPPED | OUTPATIENT
Start: 2021-01-29

## 2021-03-30 ENCOUNTER — TELEPHONE (OUTPATIENT)
Dept: CARDIOLOGY | Age: 66
End: 2021-03-30

## 2022-11-01 ENCOUNTER — ORDER TRANSCRIPTION (OUTPATIENT)
Dept: ADMINISTRATIVE | Facility: HOSPITAL | Age: 67
End: 2022-11-01

## 2022-11-01 DIAGNOSIS — Z79.899 ENCOUNTER FOR MONITORING SOTALOL THERAPY: Primary | ICD-10-CM

## 2022-11-01 DIAGNOSIS — Z01.818 PREOP TESTING: ICD-10-CM

## 2022-11-01 DIAGNOSIS — Z51.81 ENCOUNTER FOR MONITORING SOTALOL THERAPY: Primary | ICD-10-CM

## 2022-12-06 ENCOUNTER — HOSPITAL ENCOUNTER (OUTPATIENT)
Dept: INTERVENTIONAL RADIOLOGY/VASCULAR | Facility: HOSPITAL | Age: 67
Discharge: HOME | End: 2022-12-06
Attending: INTERNAL MEDICINE
Payer: COMMERCIAL

## 2024-02-28 ENCOUNTER — WORKER'S COMP (OUTPATIENT)
Dept: OCCUPATIONAL MEDICINE | Age: 69
End: 2024-02-28

## 2024-02-28 VITALS
SYSTOLIC BLOOD PRESSURE: 132 MMHG | HEIGHT: 73 IN | HEART RATE: 61 BPM | DIASTOLIC BLOOD PRESSURE: 70 MMHG | BODY MASS INDEX: 29.82 KG/M2 | TEMPERATURE: 98.8 F | WEIGHT: 225 LBS

## 2024-02-28 DIAGNOSIS — S00.03XA CONTUSION OF SCALP, INITIAL ENCOUNTER: Primary | ICD-10-CM

## 2024-02-28 DIAGNOSIS — Z23 NEED FOR VACCINATION: ICD-10-CM
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
## 2024-02-28 DIAGNOSIS — S01.01XA LACERATION OF SCALP, INITIAL ENCOUNTER: ICD-10-CM

## 2024-02-28 ASSESSMENT — PAIN SCALES - GENERAL: PAINLEVEL: 4

## 2024-08-28 RX ORDER — ATORVASTATIN CALCIUM 40 MG/1
40 TABLET, FILM COATED ORAL NIGHTLY
COMMUNITY

## 2024-08-28 RX ORDER — AMIODARONE HYDROCHLORIDE 200 MG/1
200 TABLET ORAL DAILY
COMMUNITY

## 2024-08-28 RX ORDER — MULTIVIT WITH MINERALS/LUTEIN
1000 TABLET ORAL DAILY
COMMUNITY

## 2024-08-28 RX ORDER — TADALAFIL 5 MG/1
5 TABLET ORAL
COMMUNITY

## 2024-08-28 RX ORDER — ZINC SULFATE 50(220)MG
220 CAPSULE ORAL DAILY
COMMUNITY

## 2024-08-28 RX ORDER — FUROSEMIDE 40 MG
40 TABLET ORAL DAILY
COMMUNITY

## 2024-08-28 NOTE — PAT NURSING NOTE
Per PAT encounter/pt took notes on below information:    Called pt; changes/updates to chart as noted. Discussed phone call before 6 pm for TOA (Provided Nursing Supervisor #[phone]), antibacterial soap washing night before or morning off procedure cleaning chest/neck, instructed not to shave the area/will be done at the hospital, when to be NPO(MN), which meds to hold (Diuretics (Furosemide, Spironolactone AM of), Blood Thinner (Eliquis, hold AM of), vitamins, herbal supplements, minerals, OTC, and Cialis (do not take for 24 hours prior to the procedure)), take meds allowed to take with sip of water, main entrance arrive, check into heart hospital reception desk, length of procedure and length to expect to be at hospital if all goes well, etc. Scheduled pt for labwork and EKG at [facility] on 8/31. All questions answered and pt verbalized understanding of conversation.

## 2024-08-31 ENCOUNTER — EKG ENCOUNTER (OUTPATIENT)
Dept: LAB | Age: 69
End: 2024-08-31
Attending: INTERNAL MEDICINE
Payer: COMMERCIAL

## 2024-08-31 ENCOUNTER — LAB ENCOUNTER (OUTPATIENT)
Dept: LAB | Age: 69
End: 2024-08-31
Attending: INTERNAL MEDICINE
Payer: COMMERCIAL

## 2024-08-31 DIAGNOSIS — Z01.818 PRE-OP TESTING: ICD-10-CM

## 2024-08-31 DIAGNOSIS — I48.91 ATRIAL FIBRILLATION (HCC): ICD-10-CM

## 2024-08-31 LAB
ANION GAP SERPL CALC-SCNC: 6 MMOL/L (ref 0–18)
BASOPHILS # BLD AUTO: 0.05 X10(3) UL (ref 0–0.2)
BASOPHILS NFR BLD AUTO: 0.7 %
BUN BLD-MCNC: 37 MG/DL (ref 9–23)
CALCIUM BLD-MCNC: 9.4 MG/DL (ref 8.7–10.4)
CHLORIDE SERPL-SCNC: 107 MMOL/L (ref 98–112)
CO2 SERPL-SCNC: 27 MMOL/L (ref 21–32)
CREAT BLD-MCNC: 1.9 MG/DL
EGFRCR SERPLBLD CKD-EPI 2021: 38 ML/MIN/1.73M2 (ref 60–?)
EOSINOPHIL # BLD AUTO: 0.34 X10(3) UL (ref 0–0.7)
EOSINOPHIL NFR BLD AUTO: 4.5 %
ERYTHROCYTE [DISTWIDTH] IN BLOOD BY AUTOMATED COUNT: 13.2 %
FASTING STATUS PATIENT QL REPORTED: YES
GLUCOSE BLD-MCNC: 91 MG/DL (ref 70–99)
HCT VFR BLD AUTO: 40.2 %
HGB BLD-MCNC: 13.6 G/DL
IMM GRANULOCYTES # BLD AUTO: 0.03 X10(3) UL (ref 0–1)
IMM GRANULOCYTES NFR BLD: 0.4 %
LYMPHOCYTES # BLD AUTO: 1.9 X10(3) UL (ref 1–4)
LYMPHOCYTES NFR BLD AUTO: 25 %
MCH RBC QN AUTO: 31.6 PG (ref 26–34)
MCHC RBC AUTO-ENTMCNC: 33.8 G/DL (ref 31–37)
MCV RBC AUTO: 93.5 FL
MONOCYTES # BLD AUTO: 0.79 X10(3) UL (ref 0.1–1)
MONOCYTES NFR BLD AUTO: 10.4 %
NEUTROPHILS # BLD AUTO: 4.5 X10 (3) UL (ref 1.5–7.7)
NEUTROPHILS # BLD AUTO: 4.5 X10(3) UL (ref 1.5–7.7)
NEUTROPHILS NFR BLD AUTO: 59 %
OSMOLALITY SERPL CALC.SUM OF ELEC: 298 MOSM/KG (ref 275–295)
PLATELET # BLD AUTO: 227 10(3)UL (ref 150–450)
POTASSIUM SERPL-SCNC: 4.5 MMOL/L (ref 3.5–5.1)
RBC # BLD AUTO: 4.3 X10(6)UL
SODIUM SERPL-SCNC: 140 MMOL/L (ref 136–145)
WBC # BLD AUTO: 7.6 X10(3) UL (ref 4–11)

## 2024-08-31 PROCEDURE — 36415 COLL VENOUS BLD VENIPUNCTURE: CPT

## 2024-08-31 PROCEDURE — 80048 BASIC METABOLIC PNL TOTAL CA: CPT

## 2024-08-31 PROCEDURE — 85025 COMPLETE CBC W/AUTO DIFF WBC: CPT

## 2024-09-03 LAB
ATRIAL RATE: 60 BPM
P-R INTERVAL: 178 MS
Q-T INTERVAL: 534 MS
QRS DURATION: 212 MS
QTC CALCULATION (BEZET): 534 MS
R AXIS: -88 DEGREES
T AXIS: 86 DEGREES
VENTRICULAR RATE: 60 BPM

## 2024-09-05 ENCOUNTER — HOSPITAL ENCOUNTER (OUTPATIENT)
Dept: INTERVENTIONAL RADIOLOGY/VASCULAR | Facility: HOSPITAL | Age: 69
Discharge: HOME | End: 2024-09-05
Attending: INTERNAL MEDICINE | Admitting: INTERNAL MEDICINE
Payer: COMMERCIAL

## 2024-09-05 VITALS
SYSTOLIC BLOOD PRESSURE: 111 MMHG | DIASTOLIC BLOOD PRESSURE: 64 MMHG | HEIGHT: 72 IN | RESPIRATION RATE: 17 BRPM | BODY MASS INDEX: 31.15 KG/M2 | HEART RATE: 60 BPM | OXYGEN SATURATION: 94 % | WEIGHT: 230 LBS

## 2024-09-05 DIAGNOSIS — Z45.02 ICD (IMPLANTABLE CARDIOVERTER-DEFIBRILLATOR) BATTERY DEPLETION: ICD-10-CM

## 2024-09-05 DIAGNOSIS — I48.91 ATRIAL FIBRILLATION (HCC): Primary | ICD-10-CM

## 2024-09-05 DIAGNOSIS — Z01.818 PRE-OP TESTING: ICD-10-CM

## 2024-09-05 PROCEDURE — 33264 RMVL & RPLCMT DFB GEN MLT LD: CPT | Performed by: INTERNAL MEDICINE

## 2024-09-05 PROCEDURE — 0JH608Z INSERTION OF DEFIBRILLATOR GENERATOR INTO CHEST SUBCUTANEOUS TISSUE AND FASCIA, OPEN APPROACH: ICD-10-PCS | Performed by: INTERNAL MEDICINE

## 2024-09-05 PROCEDURE — 99153 MOD SED SAME PHYS/QHP EA: CPT | Performed by: INTERNAL MEDICINE

## 2024-09-05 PROCEDURE — 99152 MOD SED SAME PHYS/QHP 5/>YRS: CPT | Performed by: INTERNAL MEDICINE

## 2024-09-05 PROCEDURE — 0JPT0PZ REMOVAL OF CARDIAC RHYTHM RELATED DEVICE FROM TRUNK SUBCUTANEOUS TISSUE AND FASCIA, OPEN APPROACH: ICD-10-PCS | Performed by: INTERNAL MEDICINE

## 2024-09-05 RX ORDER — SODIUM CHLORIDE 9 MG/ML
INJECTION, SOLUTION INTRAVENOUS
Status: DISCONTINUED | OUTPATIENT
Start: 2024-09-06 | End: 2024-09-05 | Stop reason: HOSPADM

## 2024-09-05 RX ORDER — ONDANSETRON 2 MG/ML
4 INJECTION INTRAMUSCULAR; INTRAVENOUS EVERY 6 HOURS PRN
Status: DISCONTINUED | OUTPATIENT
Start: 2024-09-05 | End: 2024-09-05

## 2024-09-05 RX ORDER — LIDOCAINE HYDROCHLORIDE 10 MG/ML
INJECTION, SOLUTION EPIDURAL; INFILTRATION; INTRACAUDAL; PERINEURAL
Status: COMPLETED
Start: 2024-09-05 | End: 2024-09-05

## 2024-09-05 RX ORDER — VANCOMYCIN HYDROCHLORIDE 1 G/20ML
INJECTION, POWDER, LYOPHILIZED, FOR SOLUTION INTRAVENOUS
Status: COMPLETED
Start: 2024-09-05 | End: 2024-09-05

## 2024-09-05 RX ORDER — MIDAZOLAM HYDROCHLORIDE 1 MG/ML
INJECTION INTRAMUSCULAR; INTRAVENOUS
Status: COMPLETED
Start: 2024-09-05 | End: 2024-09-05

## 2024-09-05 RX ORDER — CHLORHEXIDINE GLUCONATE 40 MG/ML
SOLUTION TOPICAL
Status: DISCONTINUED | OUTPATIENT
Start: 2024-09-06 | End: 2024-09-05 | Stop reason: HOSPADM

## 2024-09-05 NOTE — PLAN OF CARE
Patient had ICD generator change today with Dr. Colon. Left upper chest aquacel dressing in place, CDI. VSS. Patient denies any pain. Patient's wife @ bedside. Patient tolerating po intake. Recovery time completed. Patient ambulated to  and in room, tolerated well. Discharge instructions reviewed. IV D/C'd. Patient discharged to [facility] by wheelchair with belongings. Patient's wife is .

## 2024-09-05 NOTE — DISCHARGE INSTRUCTIONS
Pacemaker and Defibrillator Discharge Instructions    Activity  Plan to rest tonight and tomorrow.  Avoid drinking alcohol for next 24 hours.  Do not drive after procedure until 1-week device check appointment, unless otherwise instructed by your cardiologist.   Arm Restrictions:  For 30 days after implant:  do not lift arm with implanted device above your head or behind your back. Arm is to remain below your shoulder and in front of your body.   do not lift more than 10 lbs with affected arm   do not perform repetitive cycling motion with affected arm (swimming, golfing, bowling, tennis, exercise machines, raking, vacuuming, snow shoveling).   Dr. Flores Only Patients: Do not perform repetitive cycling motion for 90 days total    Incision Care/Bathing  Keep incision site completely dry for 5 days after surgery. After day 5, you can remove top dressing and shower, letting clean soapy water run over incision area, patting dry.   The white steri-strips placed directly over the incision will gradually fall off over the next few weeks and can be physically removed after 3 weeks. Do not take them off before this time. (If you have a zipline dressing, this will be removed by device nurse or MD in 4 weeks)   Keep procedure site clean and dry, do not apply any ointments, creams, lotions to the incision.   Look at your incision daily to monitor for signs and symptoms of infection (redness, swelling, drainage, foul odor from procedure site)  Do not cover incision with extra dressings or gauze unless otherwise instructed.   Do not submerge incision in water, take whirlpool baths or swim for 30 days or until site is completely healed.     When to notify your physician:   If you have chest pain, shortness of breath or persistent cough  If you experience any signs of fever (temperature >101), chills, infection (redness, swelling, thick yellow drainage, foul order from procedure site)  New or worsening swelling of arm with  implanted device   If an ICD was inserted and you receive a shock and have no symptoms, call [facility] device clinic. If you have symptoms (fainting, near fainting, dizziness, lightheadedness, chest pain, shortness of breath, palpitations), call 911.    [facility] Device Clinic Direct Line: [phone]. Monday-Friday. 8:30AM-4PM.   [facility] Main Office: [phone] Monday- Friday 8AM-5PM   [facility] Main Office: [phone] Monday-Friday 8AM-5PM  Resume your Eliquis on Sunday, September 8, 2024.

## 2024-09-06 NOTE — H&P
[facility]  H&P    Harvey Garland Patient Status:  Outpatient    1955 MRN [id number]   Location [facility] INTERVENTIONAL SUITES Attending No att. providers found   Hosp Day # 0 PCP No primary care provider on file.     Reason for Admission:  ICD gen change    Assessment/Plan:  Patient Active Problem List   Diagnosis    Internal derangement of right knee    Hyperglycemia    Community acquired pneumonia of right lung    Community acquired pneumonia of right lung, unspecified part of lung    Atrial fibrillation with rapid ventricular response (HCC)    Cardiomyopathy (HCC)       Pt presents for ICD implant for primary prevention. They have been on maximally tolerated guideline based medical therapy for 3 months and LVEF remains below 35%.      Pt has an ECG with LBBB QRS morphology with duration equal or greater than 150 ms and is recommended for CRT-D    Discussed risks and benefits of procedure including bleeding, infection, tamponade, pneumothorax, the need for lead revision and rare chance of life threatening complication.      ICD consult included physician and patient participation using Shared Decision making tools (such as [place] tool) via printout or video.      History of Present Illness:  Harvey Garland is a a(n) 69 year old male. Presents for ICD implant.     History:  Past Medical History:    Anticoagulated    Atrial fibrillation, currently in sinus rhythm    Cardiomyopathy, dilated, nonischemic (HCC)    Congestive heart disease (HCC)    Essential hypertension    On amiodarone therapy    Ventricular tachycardia (HCC)     Past Surgical History:   Procedure Laterality Date    Cardiac defibrillator placement  09    [facility]     History reviewed. No pertinent family history.   reports that he has never smoked. He has never used smokeless tobacco. He reports that he does not drink alcohol and does not use drugs.    Allergies:  No Known  Allergies    Medications:  No current facility-administered medications for this encounter.    Review of Systems:  All systems were reviewed and are negative except as described above in HPI.    Physical Exam:  Blood pressure 111/64, pulse 60, resp. rate 17, height 72\", weight 230 lb (104.3 kg), SpO2 94%.  No data recorded.    Wt Readings from Last 3 Encounters:   08/28/24 230 lb (104.3 kg)   12/08/19 257 lb 8 oz (116.8 kg)   01/19/16 260 lb (117.9 kg)       Telemetry: AV pacing  General: Alert and oriented in no apparent distress.  HEENT: No focal deficits.  Neck: No JVD, carotids 2+ no bruits.  Cardiac: Regular rate and rhythm, S1, S2 normal, no murmur, rub or gallop.  Lungs: Clear without wheezes, rales, rhonchi or dullness.  Normal excursions and effort.  Abdomen: Soft, non-tender.   Extremities: Without clubbing, cyanosis or edema.  Peripheral pulses are 2+.  Neurologic: Alert and oriented, normal affect.  Skin: Warm and dry.     Laboratories and Data:  Diagnostics:    Labs:        Lab Results   Component Value Date    INR 0.90 12/08/2019         Shahram Colon MD  9/6/2024  5:00 AM    Shahram Colon MD  [facility]  Cardiac Electrophysiolgy

## 2024-09-06 NOTE — PROCEDURES
OPERATION(S) PERFORMED:   1. CRT-D implant.   2. CRT generator removal.     : ROSAURA Colon MD  Pre-Op: Device at PAT  Post-Op: Pacemaker In Situ.  COMPLICATIONS: None     ESTIMATED BLOOD LOSS: Minimal.  SEDATION: IV was maintained by RN. Patient was assessed by myself and the nursing staff, IV sedation (versed and fentanyl) were administered during continuous ECG, pulse oximeter, and non-invasive hemodynamic monitoring. I was present from the time of sedation being started to the end of the procedure ([id number]).        METHODS: The patient was brought to the outpatient cardiac telemetry unit in a fasting and nonsedated state after providing informed consent. IV sedation was administered during continuous ECG, pulse oximeter, and noninvasive hemodynamic monitoring. After administering 1% lidocaine for local anesthesia, an incision was adjacent to the previous incision. The plane of the incision was extended to expose the old generator and leads. The generator was removed from the pocket. The leads were visually inspected, there was no evidence of lead breakdown, the leads appeared to be intact.  The pocket was irrigated with antibiotic solution. Bleeding was sought for until hemostasis was achieved. The leads were connected to a pulse generator. They were coiled and placed into the pocket along with the pulse generator and a Tyrx pouch. The incision was closed in 2 layers using 2-0 and 3-0 Vicryl. Steri-Strips were applied followed by a sterile dressing. The left arm was placed in a sling and the patient was transported to telemetry in stable condition. There were no apparent intraoperative complications.       CONCLUSIONS:   1. Status post successful generator change of a CRT-ICD.   2. Adequate/stable RA, RV and LV pacing and sensing thresholds.       Shahram Colon MD  [facility]  Cardiac Electrophysiolgy

## 2024-12-16 ENCOUNTER — WORKER'S COMP (OUTPATIENT)
Dept: OCCUPATIONAL MEDICINE | Age: 69
End: 2024-12-16

## 2024-12-16 ENCOUNTER — IMAGING SERVICES (OUTPATIENT)
Dept: GENERAL RADIOLOGY | Age: 69
End: 2024-12-16

## 2024-12-16 VITALS
SYSTOLIC BLOOD PRESSURE: 167 MMHG | HEART RATE: 77 BPM | DIASTOLIC BLOOD PRESSURE: 87 MMHG | HEIGHT: 72 IN | WEIGHT: 234 LBS | BODY MASS INDEX: 31.69 KG/M2

## 2024-12-16 DIAGNOSIS — S90.32XA CONTUSION OF LEFT FOOT, INITIAL ENCOUNTER: ICD-10-CM

## 2024-12-16 DIAGNOSIS — S90.32XA CONTUSION OF LEFT FOOT, INITIAL ENCOUNTER: Primary | ICD-10-CM

## 2024-12-16 PROCEDURE — 73630 X-RAY EXAM OF FOOT: CPT | Performed by: RADIOLOGY

## 2024-12-16 ASSESSMENT — ENCOUNTER SYMPTOMS
ALLERGIC/IMMUNOLOGIC NEGATIVE: 1
HEMATOLOGIC/LYMPHATIC NEGATIVE: 1
PSYCHIATRIC NEGATIVE: 1
ENDOCRINE NEGATIVE: 1
EYES NEGATIVE: 1
CONSTITUTIONAL NEGATIVE: 1
GASTROINTESTINAL NEGATIVE: 1
RESPIRATORY NEGATIVE: 1
NEUROLOGICAL NEGATIVE: 1

## 2024-12-16 ASSESSMENT — PAIN SCALES - GENERAL: PAINLEVEL: 7

## 2024-12-30 ENCOUNTER — WORKER'S COMP (OUTPATIENT)
Dept: OCCUPATIONAL MEDICINE | Age: 69
End: 2024-12-30

## 2024-12-30 VITALS — DIASTOLIC BLOOD PRESSURE: 85 MMHG | TEMPERATURE: 99.8 F | SYSTOLIC BLOOD PRESSURE: 131 MMHG | HEART RATE: 72 BPM

## 2024-12-30 DIAGNOSIS — S90.32XD CONTUSION OF LEFT FOOT, SUBSEQUENT ENCOUNTER: Primary | ICD-10-CM
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
## 2024-12-30 PROCEDURE — 99213 OFFICE O/P EST LOW 20 MIN: CPT | Performed by: PHYSICIAN ASSISTANT

## 2024-12-30 PROCEDURE — 3079F DIAST BP 80-89 MM HG: CPT | Performed by: PHYSICIAN ASSISTANT

## 2024-12-30 PROCEDURE — 3075F SYST BP GE 130 - 139MM HG: CPT | Performed by: PHYSICIAN ASSISTANT

## 2024-12-30 ASSESSMENT — PAIN SCALES - GENERAL: PAINLEVEL: 0

## 2025-04-10 ENCOUNTER — LAB ENCOUNTER (OUTPATIENT)
Dept: LAB | Facility: HOSPITAL | Age: 70
End: 2025-04-10
Attending: INTERNAL MEDICINE
Payer: COMMERCIAL

## 2025-04-10 DIAGNOSIS — I42.0 NONISCHEMIC CONGESTIVE CARDIOMYOPATHY (HCC): Primary | ICD-10-CM

## 2025-04-10 DIAGNOSIS — Z86.79 ATRIAL FIBRILLATION, CURRENTLY IN SINUS RHYTHM: ICD-10-CM

## 2025-04-10 DIAGNOSIS — I47.20 VENTRICULAR TACHYCARDIA (HCC): ICD-10-CM

## 2025-04-10 DIAGNOSIS — I10 HYPERTENSION, ESSENTIAL: ICD-10-CM

## 2025-04-10 DIAGNOSIS — R06.09 DOE (DYSPNEA ON EXERTION): ICD-10-CM

## 2025-04-10 LAB
ALBUMIN SERPL-MCNC: 4.8 G/DL (ref 3.2–4.8)
ALBUMIN/GLOB SERPL: 1.5 {RATIO} (ref 1–2)
ALP LIVER SERPL-CCNC: 94 U/L (ref 45–117)
ALT SERPL-CCNC: 44 U/L (ref 10–49)
ANION GAP SERPL CALC-SCNC: 10 MMOL/L (ref 0–18)
AST SERPL-CCNC: 41 U/L (ref ?–34)
BASOPHILS # BLD AUTO: 0.05 X10(3) UL (ref 0–0.2)
BASOPHILS NFR BLD AUTO: 0.5 %
BILIRUB SERPL-MCNC: 0.9 MG/DL (ref 0.2–1.1)
BUN BLD-MCNC: 39 MG/DL (ref 9–23)
CALCIUM BLD-MCNC: 9.7 MG/DL (ref 8.7–10.6)
CHLORIDE SERPL-SCNC: 101 MMOL/L (ref 98–112)
CHOLEST SERPL-MCNC: 145 MG/DL (ref ?–200)
CO2 SERPL-SCNC: 28 MMOL/L (ref 21–32)
CREAT BLD-MCNC: 2.02 MG/DL (ref 0.7–1.3)
EGFRCR SERPLBLD CKD-EPI 2021: 35 ML/MIN/1.73M2 (ref 60–?)
EOSINOPHIL # BLD AUTO: 0.38 X10(3) UL (ref 0–0.7)
EOSINOPHIL NFR BLD AUTO: 4.1 %
ERYTHROCYTE [DISTWIDTH] IN BLOOD BY AUTOMATED COUNT: 13.4 %
FASTING PATIENT LIPID ANSWER: NO
FASTING STATUS PATIENT QL REPORTED: NO
GLOBULIN PLAS-MCNC: 3.1 G/DL (ref 2–3.5)
GLUCOSE BLD-MCNC: 86 MG/DL (ref 70–99)
HCT VFR BLD AUTO: 42.5 % (ref 39–53)
HDLC SERPL-MCNC: 49 MG/DL (ref 40–59)
HGB BLD-MCNC: 14.2 G/DL (ref 13–17.5)
IMM GRANULOCYTES # BLD AUTO: 0.03 X10(3) UL (ref 0–1)
IMM GRANULOCYTES NFR BLD: 0.3 %
LDLC SERPL CALC-MCNC: 77 MG/DL (ref ?–100)
LYMPHOCYTES # BLD AUTO: 2.6 X10(3) UL (ref 1–4)
LYMPHOCYTES NFR BLD AUTO: 28.1 %
MCH RBC QN AUTO: 31.1 PG (ref 26–34)
MCHC RBC AUTO-ENTMCNC: 33.4 G/DL (ref 31–37)
MCV RBC AUTO: 93 FL (ref 80–100)
MONOCYTES # BLD AUTO: 1.03 X10(3) UL (ref 0.1–1)
MONOCYTES NFR BLD AUTO: 11.1 %
NEUTROPHILS # BLD AUTO: 5.16 X10 (3) UL (ref 1.5–7.7)
NEUTROPHILS # BLD AUTO: 5.16 X10(3) UL (ref 1.5–7.7)
NEUTROPHILS NFR BLD AUTO: 55.9 %
NONHDLC SERPL-MCNC: 96 MG/DL (ref ?–130)
OSMOLALITY SERPL CALC.SUM OF ELEC: 297 MOSM/KG (ref 275–295)
PLATELET # BLD AUTO: 231 10(3)UL (ref 150–450)
POTASSIUM SERPL-SCNC: 4.1 MMOL/L (ref 3.5–5.1)
PROT SERPL-MCNC: 7.9 G/DL (ref 5.7–8.2)
RBC # BLD AUTO: 4.57 X10(6)UL (ref 3.8–5.8)
SODIUM SERPL-SCNC: 139 MMOL/L (ref 136–145)
T4 FREE SERPL-MCNC: 1.3 NG/DL (ref 0.8–1.7)
TRIGL SERPL-MCNC: 106 MG/DL (ref 30–149)
TSI SER-ACNC: 6.2 UIU/ML (ref 0.55–4.78)
VLDLC SERPL CALC-MCNC: 16 MG/DL (ref 0–30)
WBC # BLD AUTO: 9.3 X10(3) UL (ref 4–11)

## 2025-04-10 PROCEDURE — 36415 COLL VENOUS BLD VENIPUNCTURE: CPT

## 2025-04-10 PROCEDURE — 84443 ASSAY THYROID STIM HORMONE: CPT

## 2025-04-10 PROCEDURE — 80053 COMPREHEN METABOLIC PANEL: CPT

## 2025-04-10 PROCEDURE — 80061 LIPID PANEL: CPT

## 2025-04-10 PROCEDURE — 85025 COMPLETE CBC W/AUTO DIFF WBC: CPT

## 2025-04-10 PROCEDURE — 84439 ASSAY OF FREE THYROXINE: CPT

## 2025-04-11 NOTE — PAT NURSING NOTE
Per PAT encounter/pt currently at work site and jotted down some notes, states would follow up with RN on Monday/Tuesday to confirm information again/go over day of specifics:    Called pt; changes/updates to chart as noted. Discussed phone call before 6 pm for TOA (provided Nursing Supervisor #[phone]), antibacterial soap washing night before or morning of procedure cleaning groins, when to be NPO(MN), which meds to hold (Lisinopril LD 4/16 AM, No Cialis for 24hrs prior, Eliquis LD 4/16 PM; vitamins, herbal supplements, minerals, OTC stop moving forward), take meds allowed to take with sip of water, main entrance arrive, etc. All questions answered and pt verbalized understanding of conversation.

## 2025-04-17 ENCOUNTER — ANESTHESIA (OUTPATIENT)
Dept: INTERVENTIONAL RADIOLOGY/VASCULAR | Facility: HOSPITAL | Age: 70
End: 2025-04-17
Payer: COMMERCIAL

## 2025-04-17 ENCOUNTER — HOSPITAL ENCOUNTER (OUTPATIENT)
Dept: INTERVENTIONAL RADIOLOGY/VASCULAR | Facility: HOSPITAL | Age: 70
Discharge: HOME | End: 2025-04-17
Attending: INTERNAL MEDICINE | Admitting: INTERNAL MEDICINE
Payer: COMMERCIAL

## 2025-04-17 VITALS
HEART RATE: 65 BPM | OXYGEN SATURATION: 93 % | WEIGHT: 233 LBS | BODY MASS INDEX: 31.56 KG/M2 | TEMPERATURE: 98 F | RESPIRATION RATE: 16 BRPM | SYSTOLIC BLOOD PRESSURE: 127 MMHG | DIASTOLIC BLOOD PRESSURE: 75 MMHG | HEIGHT: 72 IN

## 2025-04-17 DIAGNOSIS — I48.91 A-FIB (HCC): ICD-10-CM

## 2025-04-17 LAB
ATRIAL RATE: 64 BPM
ISTAT ACTIVATED CLOTTING TIME: 279 SECONDS (ref 74–137)
ISTAT ACTIVATED CLOTTING TIME: 291 SECONDS (ref 74–137)
ISTAT ACTIVATED CLOTTING TIME: 343 SECONDS (ref 74–137)
P AXIS: -39 DEGREES
P-R INTERVAL: 206 MS
Q-T INTERVAL: 446 MS
QRS DURATION: 124 MS
QTC CALCULATION (BEZET): 460 MS
R AXIS: -77 DEGREES
T AXIS: 96 DEGREES
VENTRICULAR RATE: 64 BPM

## 2025-04-17 PROCEDURE — 93656 COMPRE EP EVAL ABLTJ ATR FIB: CPT | Performed by: INTERNAL MEDICINE

## 2025-04-17 PROCEDURE — 93010 ELECTROCARDIOGRAM REPORT: CPT | Performed by: INTERNAL MEDICINE

## 2025-04-17 PROCEDURE — 85347 COAGULATION TIME ACTIVATED: CPT

## 2025-04-17 PROCEDURE — 93657 TX L/R ATRIAL FIB ADDL: CPT | Performed by: INTERNAL MEDICINE

## 2025-04-17 PROCEDURE — 93005 ELECTROCARDIOGRAM TRACING: CPT

## 2025-04-17 RX ORDER — ACETAMINOPHEN 500 MG
1000 TABLET ORAL ONCE AS NEEDED
Status: DISCONTINUED | OUTPATIENT
Start: 2025-04-17 | End: 2025-04-17 | Stop reason: HOSPADM

## 2025-04-17 RX ORDER — HYDROMORPHONE HYDROCHLORIDE 1 MG/ML
0.6 INJECTION, SOLUTION INTRAMUSCULAR; INTRAVENOUS; SUBCUTANEOUS EVERY 5 MIN PRN
Status: DISCONTINUED | OUTPATIENT
Start: 2025-04-17 | End: 2025-04-17 | Stop reason: HOSPADM

## 2025-04-17 RX ORDER — ROCURONIUM BROMIDE 10 MG/ML
INJECTION, SOLUTION INTRAVENOUS AS NEEDED
Status: DISCONTINUED | OUTPATIENT
Start: 2025-04-17 | End: 2025-04-17 | Stop reason: SURG

## 2025-04-17 RX ORDER — HYDROCODONE BITARTRATE AND ACETAMINOPHEN 5; 325 MG/1; MG/1
1 TABLET ORAL ONCE AS NEEDED
Status: DISCONTINUED | OUTPATIENT
Start: 2025-04-17 | End: 2025-04-17 | Stop reason: HOSPADM

## 2025-04-17 RX ORDER — SODIUM CHLORIDE 9 MG/ML
INJECTION, SOLUTION INTRAVENOUS CONTINUOUS
Status: DISCONTINUED | OUTPATIENT
Start: 2025-04-17 | End: 2025-04-17

## 2025-04-17 RX ORDER — HEPARIN SODIUM 5000 [USP'U]/ML
INJECTION, SOLUTION INTRAVENOUS; SUBCUTANEOUS
Status: COMPLETED
Start: 2025-04-17 | End: 2025-04-17

## 2025-04-17 RX ORDER — HEPARIN SODIUM 1000 [USP'U]/ML
INJECTION, SOLUTION INTRAVENOUS; SUBCUTANEOUS AS NEEDED
Status: DISCONTINUED | OUTPATIENT
Start: 2025-04-17 | End: 2025-04-17 | Stop reason: SURG

## 2025-04-17 RX ORDER — NALOXONE HYDROCHLORIDE 0.4 MG/ML
0.08 INJECTION, SOLUTION INTRAMUSCULAR; INTRAVENOUS; SUBCUTANEOUS AS NEEDED
Status: DISCONTINUED | OUTPATIENT
Start: 2025-04-17 | End: 2025-04-17 | Stop reason: HOSPADM

## 2025-04-17 RX ORDER — HYDROMORPHONE HYDROCHLORIDE 1 MG/ML
0.4 INJECTION, SOLUTION INTRAMUSCULAR; INTRAVENOUS; SUBCUTANEOUS EVERY 5 MIN PRN
Status: DISCONTINUED | OUTPATIENT
Start: 2025-04-17 | End: 2025-04-17 | Stop reason: HOSPADM

## 2025-04-17 RX ORDER — LIDOCAINE HYDROCHLORIDE AND EPINEPHRINE 10; 10 MG/ML; UG/ML
INJECTION, SOLUTION INFILTRATION; PERINEURAL
Status: COMPLETED
Start: 2025-04-17 | End: 2025-04-17

## 2025-04-17 RX ORDER — SODIUM CHLORIDE, SODIUM LACTATE, POTASSIUM CHLORIDE, CALCIUM CHLORIDE 600; 310; 30; 20 MG/100ML; MG/100ML; MG/100ML; MG/100ML
INJECTION, SOLUTION INTRAVENOUS CONTINUOUS
Status: DISCONTINUED | OUTPATIENT
Start: 2025-04-17 | End: 2025-04-17

## 2025-04-17 RX ORDER — SODIUM CHLORIDE 9 MG/ML
INJECTION, SOLUTION INTRAVENOUS CONTINUOUS PRN
Status: DISCONTINUED | OUTPATIENT
Start: 2025-04-17 | End: 2025-04-17 | Stop reason: SURG

## 2025-04-17 RX ORDER — LIDOCAINE HYDROCHLORIDE 10 MG/ML
INJECTION, SOLUTION EPIDURAL; INFILTRATION; INTRACAUDAL; PERINEURAL AS NEEDED
Status: DISCONTINUED | OUTPATIENT
Start: 2025-04-17 | End: 2025-04-17 | Stop reason: SURG

## 2025-04-17 RX ORDER — PROTAMINE SULFATE 10 MG/ML
INJECTION, SOLUTION INTRAVENOUS AS NEEDED
Status: DISCONTINUED | OUTPATIENT
Start: 2025-04-17 | End: 2025-04-17 | Stop reason: SURG

## 2025-04-17 RX ORDER — ONDANSETRON 2 MG/ML
4 INJECTION INTRAMUSCULAR; INTRAVENOUS EVERY 6 HOURS PRN
Status: DISCONTINUED | OUTPATIENT
Start: 2025-04-17 | End: 2025-04-17 | Stop reason: HOSPADM

## 2025-04-17 RX ORDER — ONDANSETRON 2 MG/ML
INJECTION INTRAMUSCULAR; INTRAVENOUS AS NEEDED
Status: DISCONTINUED | OUTPATIENT
Start: 2025-04-17 | End: 2025-04-17 | Stop reason: SURG

## 2025-04-17 RX ORDER — DEXAMETHASONE SODIUM PHOSPHATE 4 MG/ML
VIAL (ML) INJECTION AS NEEDED
Status: DISCONTINUED | OUTPATIENT
Start: 2025-04-17 | End: 2025-04-17 | Stop reason: SURG

## 2025-04-17 RX ORDER — HEPARIN SODIUM 1000 [USP'U]/ML
INJECTION, SOLUTION INTRAVENOUS; SUBCUTANEOUS
Status: COMPLETED
Start: 2025-04-17 | End: 2025-04-17

## 2025-04-17 RX ORDER — HYDROMORPHONE HYDROCHLORIDE 1 MG/ML
0.2 INJECTION, SOLUTION INTRAMUSCULAR; INTRAVENOUS; SUBCUTANEOUS EVERY 5 MIN PRN
Status: DISCONTINUED | OUTPATIENT
Start: 2025-04-17 | End: 2025-04-17 | Stop reason: HOSPADM

## 2025-04-17 RX ORDER — EPHEDRINE SULFATE 50 MG/ML
INJECTION INTRAVENOUS AS NEEDED
Status: DISCONTINUED | OUTPATIENT
Start: 2025-04-17 | End: 2025-04-17 | Stop reason: SURG

## 2025-04-17 RX ORDER — HYDROCODONE BITARTRATE AND ACETAMINOPHEN 5; 325 MG/1; MG/1
2 TABLET ORAL ONCE AS NEEDED
Status: DISCONTINUED | OUTPATIENT
Start: 2025-04-17 | End: 2025-04-17 | Stop reason: HOSPADM

## 2025-04-17 RX ADMIN — HEPARIN SODIUM 1000 UNITS: 1000 INJECTION, SOLUTION INTRAVENOUS; SUBCUTANEOUS at 15:19:00

## 2025-04-17 RX ADMIN — HEPARIN SODIUM 15000 UNITS: 1000 INJECTION, SOLUTION INTRAVENOUS; SUBCUTANEOUS at 13:54:00

## 2025-04-17 RX ADMIN — ONDANSETRON 4 MG: 2 INJECTION INTRAMUSCULAR; INTRAVENOUS at 15:40:00

## 2025-04-17 RX ADMIN — LIDOCAINE HYDROCHLORIDE 50 MG: 10 INJECTION, SOLUTION EPIDURAL; INFILTRATION; INTRACAUDAL; PERINEURAL at 13:52:00

## 2025-04-17 RX ADMIN — DEXAMETHASONE SODIUM PHOSPHATE 4 MG: 4 MG/ML VIAL (ML) INJECTION at 13:57:00

## 2025-04-17 RX ADMIN — PROTAMINE SULFATE 50 MG: 10 INJECTION, SOLUTION INTRAVENOUS at 15:40:00

## 2025-04-17 RX ADMIN — ROCURONIUM BROMIDE 20 MG: 10 INJECTION, SOLUTION INTRAVENOUS at 14:18:00

## 2025-04-17 RX ADMIN — HEPARIN SODIUM 5000 UNITS: 1000 INJECTION, SOLUTION INTRAVENOUS; SUBCUTANEOUS at 14:51:00

## 2025-04-17 RX ADMIN — EPHEDRINE SULFATE 5 MG: 50 INJECTION INTRAVENOUS at 14:22:00

## 2025-04-17 RX ADMIN — HEPARIN SODIUM 5000 UNITS: 1000 INJECTION, SOLUTION INTRAVENOUS; SUBCUTANEOUS at 14:30:00

## 2025-04-17 RX ADMIN — SODIUM CHLORIDE: 9 INJECTION, SOLUTION INTRAVENOUS at 13:45:00

## 2025-04-17 RX ADMIN — ROCURONIUM BROMIDE 80 MG: 10 INJECTION, SOLUTION INTRAVENOUS at 13:54:00

## 2025-04-17 NOTE — PROGRESS NOTES
Pt s/p ablation via R fem vein with vascade closure. Site remained CDI throughout recovery period Pt ambulated to bathroom- fem site oozing. Pt back to bed, no active bleeding noted.Manual pressure held for 5min. No hematoma noted. Quick clot placed to site. discharge instructions given-pt verbalized understanding.Report given to Gera MALDONADO

## 2025-04-17 NOTE — H&P
H&P    Harvey Garland Patient Status:  Outpatient    1955 MRN [id number]   [facility] INTERVENTIONAL SUITES Attending Shahram Colon MD   Hosp Day # 0 PCP DAVID SCHULZ     Reason for Admission:  EPS and ablation     Assessment/Plan:  Problem List[1]    Discussed risks and benefits of procedure including bleeding, infection, tamponade, stroke, AV block, and rare chance of life threatening complication.     History of Present Illness:  Harvey Garland is a a(n) 69 year old male. Presents for EPS and ablation for atrial fibrillation.     History:  Past Medical History[2]  Past Surgical History[3]  Family History[4]   reports that he has never smoked. He has never used smokeless tobacco. He reports that he does not drink alcohol and does not use drugs.    Allergies:  Allergies[5]    Medications:  Current Hospital Medications[6]    Review of Systems:  All systems were reviewed and are negative except as described above in HPI.    Physical Exam:  Blood pressure 129/83, pulse 60, temperature 97.7 °F (36.5 °C), temperature source Temporal, resp. rate 17, height 72\", weight 233 lb (105.7 kg), SpO2 95%.  Temp (24hrs), Av.7 °F (36.5 °C), Min:97.7 °F (36.5 °C), Max:97.7 °F (36.5 °C)    Wt Readings from Last 3 Encounters:   25 233 lb (105.7 kg)   24 230 lb (104.3 kg)   19 257 lb 8 oz (116.8 kg)       Telemetry: AV paced.   General: Alert and oriented in no apparent distress.  HEENT: No focal deficits.  Neck: No JVD, carotids 2+ no bruits.  Cardiac: Regular rate and rhythm, S1, S2 normal, no murmur, rub or gallop.  Lungs: Clear without wheezes, rales, rhonchi or dullness.  Normal excursions and effort.  Abdomen: Soft, non-tender.   Extremities: Without clubbing, cyanosis or edema.  Peripheral pulses are 2+.  Neurologic: Alert and oriented, normal affect.  Skin: Warm and dry.     Laboratories and Data:  Diagnostics:    Labs:        Lab Results   Component Value Date    INR 0.90 2019          Shahram Colon MD  4/17/2025  3:40 PM    Shahram Colon MD  [facility]  Cardiac Electrophysiolgy         [1]   Patient Active Problem List  Diagnosis    Internal derangement of right knee    Hyperglycemia    Community acquired pneumonia of right lung    Community acquired pneumonia of right lung, unspecified part of lung    Atrial fibrillation with rapid ventricular response (HCC)    Cardiomyopathy (HCC)   [2]   Past Medical History:   Anticoagulated    Atrial fibrillation, currently in sinus rhythm    Cardiomyopathy, dilated, nonischemic (HCC)    Congestive heart disease (HCC)    Essential hypertension    On amiodarone therapy    Ventricular tachycardia (HCC)   [3]   Past Surgical History:  Procedure Laterality Date    Cardiac defibrillator placement  12/8/09    [facility]   [4] History reviewed. No pertinent family history.  [5] No Known Allergies  [6] No current facility-administered medications for this encounter.    Facility-Administered Medications Ordered in Other Encounters:     lidocaine PF (Xylocaine-MPF) 1% injection, , Injection, PRN    propofol (Diprivan) 10 MG/ML injection, , Intravenous, PRN    rocuronium (Zemuron) 50 mg/5mL injection, , Intravenous, PRN    heparin (Porcine) 1000 UNIT/ML injection, , Intravenous, PRN    norepinephrine (Levophed) 4 mg/250mL infusion premix, , Intravenous, Continuous PRN    sodium chloride 0.9% infusion, , Intravenous, Continuous PRN    dexamethasone (Decadron) 4 MG/ML injection, , Intravenous, PRN    ePHEDrine 50 MG/ML injection, , Intravenous, PRN

## 2025-04-17 NOTE — ANESTHESIA PREPROCEDURE EVALUATION
PRE-OP EVALUATION    Patient Name: Harvey Garland    Admit Diagnosis: A-fib (HCC) [I48.91]    Pre-op Diagnosis: * No surgery found *        Anesthesia Procedure: CATH EP    * Surgery not found *    Pre-op vitals reviewed.        Body mass index is 31.6 kg/m².    Current medications reviewed.  Hospital Medications:  Current Medications[1]    Outpatient Medications:   Prescriptions Prior to Admission[2]    Allergies: Patient has no known allergies.      Anesthesia Evaluation    Patient summary reviewed.    Anesthetic Complications  (-) history of anesthetic complications         GI/Hepatic/Renal                                 Cardiovascular  Comment: NICM    Conclusions:     1. Left ventricle: The cavity size was normal. Wall thickness was mildly to      moderately increased. Systolic function was reduced. The estimated      ejection fraction was 45-50%. Hypokinesis of inferior basal wall.   2. Aortic valve: Structurally normal valve. Trileaflet. Trivial      regurgitation.   3. Aorta: Ascending aorta diameter was 4.4cm. Aortic root was 4.8cm at the      sinus of Valsalva.   4. Mitral valve: Mildly calcified annulus.   5. Left atrium: The left atrium was dilated. The left atrial volume was      mildly to moderately increased.     Impressions:  This study is compared with previous dated 10/09/2018: Thhe   ejection fraction is reduced and the aorta is slighlty enlarged.       ECG reviewed.            (+) hypertension   (+) hyperlipidemia        (+) pacemaker/AICD       (+) dysrhythmias and atrial fibrillation  (+) CHF                Endo/Other                     (+) clotting disorder             Pulmonary                           Neuro/Psych                                      Past Surgical History[3]  Social Hx on file[4]  History   Drug Use No     Available pre-op labs reviewed.  Lab Results   Component Value Date    WBC 9.3 04/10/2025    RBC 4.57 04/10/2025    HGB 14.2 04/10/2025    HCT 42.5 04/10/2025    MCV 93.0  04/10/2025    MCH 31.1 04/10/2025    MCHC 33.4 04/10/2025    RDW 13.4 04/10/2025    .0 04/10/2025     Lab Results   Component Value Date     04/10/2025    K 4.1 04/10/2025     04/10/2025    CO2 28.0 04/10/2025    BUN 39 (H) 04/10/2025    CREATSERUM 2.02 (H) 04/10/2025    GLU 86 04/10/2025    CA 9.7 04/10/2025            Airway             Cardiovascular             Dental  Comment: No loose teeth reported by patient           Pulmonary                     Other findings              ASA: 4     NPO status verified and     Post-procedure pain management plan discussed with surgeon and patient.    Comment: GETA/LMA discussed in detail.  Risk of complications discussed including but not limited to sore throat, cough, PONV discussed. Also, discussed risks including dental injury particularly on any weakened, treated or diseased teeth & pt wishes to proceed  All questions answered.    Plan/risks discussed with: patient                Present on Admission:  **None**             [1]    [COMPLETED] heparin (Porcine) 5000 UNIT/ML injection        [COMPLETED] heparin (Porcine) 1000 UNIT/ML injection        [COMPLETED] lidocaine-EPINEPHrine (Xylocaine-Epinephrine) 1 %-1:812672 injection        [COMPLETED] heparin in sodium chloride 0.9% (Porcine) 2 Units/mL flush bag premix       [2]   Medications Prior to Admission   Medication Sig Dispense Refill Last Dose/Taking    amiodarone 200 MG Oral Tab Take 1 tablet (200 mg total) by mouth in the morning.   4/17/2025    atorvastatin 40 MG Oral Tab Take 1 tablet (40 mg total) by mouth nightly.   4/16/2025    furosemide 40 MG Oral Tab Take 1 tablet (40 mg total) by mouth in the morning.   4/16/2025    tadalafil 5 MG Oral Tab Take 1 tablet (5 mg total) by mouth daily as needed for Erectile Dysfunction.   Taking As Needed    Ascorbic Acid (VITAMIN C) 1000 MG Oral Tab Take 1 tablet (1,000 mg total) by mouth in the morning.   Past Week    zinc sulfate 220 (50 Zn) MG Oral  Cap Take 1 capsule (220 mg total) by mouth in the morning.   Past Week    Multiple Vitamins-Minerals (MULTIVITAMIN MEN OR) Take 1 tablet by mouth in the morning. [employer] 2.   Past Week    apixaban 5 MG Oral Tab Take 1 tablet (5 mg total) by mouth 2 (two) times daily. 60 tablet 3 Taking    digoxin 0.125 MG Oral Tab Take 1 tablet (125 mcg total) by mouth daily. 30 tablet 3 4/17/2025    carvedilol (COREG) 25 MG Oral Tab Take 1 tablet (25 mg total) by mouth in the morning and 1 tablet (25 mg total) in the evening. Take with meals.   4/17/2025    spironolactone (ALDACTONE) 25 MG Oral Tab Take 1 tablet (25 mg total) by mouth in the morning.   4/16/2025    lisinopril (PRINIVIL,ZESTRIL) 20 MG Oral Tab Take 1 tablet (20 mg total) by mouth in the morning and 1 tablet (20 mg total) before bedtime.   4/16/2025   [3]   Past Surgical History:  Procedure Laterality Date    Cardiac defibrillator placement  12/8/09    [facility]   [4]   Social History  Socioeconomic History    Marital status:    Tobacco Use    Smoking status: Never    Smokeless tobacco: Never   Vaping Use    Vaping status: Never Used   Substance and Sexual Activity    Alcohol use: No    Drug use: No

## 2025-04-17 NOTE — ANESTHESIA PROCEDURE NOTES
Arterial Line    Date/Time: 4/17/2025 2:09 PM    Performed by: Delbert Heck MD  Authorized by: Delbert Heck MD    General Information and Staff    Procedure Start:  4/17/2025 2:09 PM  Procedure End:  4/17/2025 2:09 PM  Anesthesiologist:  Delbert Heck MD  Performed By:  Anesthesiologist  Patient Location:  OR  Indication: continuous blood pressure monitoring and blood sampling needed    Site Identification: real time ultrasound guided and image stored and retrievable    Preanesthetic Checklist: 2 patient identifiers, IV checked, risks and benefits discussed, monitors and equipment checked, pre-op evaluation, timeout performed, anesthesia consent and sterile technique used    Procedure Details    Catheter Size:  20 G  Catheter Length:  1 and 3/4 inch  Catheter Type:  Arrow  Seldinger Technique?: Yes    Laterality:  Left  Site:  Radial artery  Site Prep: chlorhexidine    Line Secured:  Tape and Tegaderm    Assessment    Events: patient tolerated procedure well with no complications      Medications  4/17/2025 2:09 PM      Additional Comments      Ultrasound used, image saved on [employer]

## 2025-04-17 NOTE — ANESTHESIA POSTPROCEDURE EVALUATION
[facility]    Harvey Garland Patient Status:  Outpatient   Age/Gender 69 year old male MRN [id number]   Location [facility] INTERVENTIONAL SUITES Attending Shahram Colon MD   Hosp Day # 0 PCP DAVID SCHULZ       Anesthesia Post-op Note        Procedure Summary       Date: 04/17/25 Room / Location: [facility] Interventional Suites    Anesthesia Start: 1345 Anesthesia Stop: 1557    Procedure: CATH EP-RF ablation Diagnosis:       A-fib (HCC)      A-fib (HCC)    Scheduled Providers: Jamie Lopez MD Anesthesiologist: Delbert Heck MD    Anesthesia Type: general ASA Status: 4            Anesthesia Type: general    Vitals Value Taken Time   /72 04/17/25 15:57   Temp 97 04/17/25 15:57   Pulse 85 04/17/25 15:57   Resp 19 04/17/25 15:57   SpO2 99 04/17/25 15:57       afib PFA/ANES    Patient Location: PACU    Anesthesia Type: general    Airway Patency: patent    Postop Pain Control: adequate    Mental Status: mildly sedated but able to meaningfully participate in the post-anesthesia evaluation    Nausea/Vomiting: none    Cardiopulmonary/Hydration status: stable euvolemic    Complications: no apparent anesthesia related complications    Postop vital signs: stable    Dental Exam: Unchanged from Preop

## 2025-04-17 NOTE — DISCHARGE INSTRUCTIONS
HOME CARE INSTRUCTIONS FOLLOWING CARDIAC ABLATION (RFA) OR ELECTROPHYSIOLOGIC STUDY (EPS)    Activity:  - DO NOT drive after the procedure. You may resume driving late the following day according to the nurse or physician's instructions.  - Plan on resting and relaxing tonight and tomorrow. It will be normal to tire easily for the first few days, depending on the length of the procedure and the amount of sedation you received.   - DO NOT lift anything over 10 pounds for the next 24 hours.  - Avoid sexual activity for the next 24 hours.  - Avoid repeated stair use and excessive walking for the next 24 hours.   - Avoid drinking alcohol for the next 24 hours.  - Resume your normal activity after 48 hours, or as instructed by your physician.    What is Normal:  - You may feel extra heart beats. If these beats come too often or you feel an episode of multiple fast heartbeats, notify your physician.  - The procedure site may appear bruised or discolored.  - There may be a small amount of drainage on the bandage  - There may be mild tenderness to the procedure site when touched, which is common.     Special Instructions:  - The bandage is to remain in place for 24 hours. Keep the bandage clean and dry. Do not submerge the site for 72 hours (no tub, baths or pools).  - After 24 hours you must remove the bandage. Wash the procedure site gently with soap and water. If you choose to wear a bandaid for a few days, make sure it remains clean and dry and that it is changed daily.  - The day after the procedure you may shower after you remove your dressing (but not baths).    MONITOR YOUR GROIN SITE FOR ANY BLEEDING OR SWELLING.IF EITHER OCCUR, LAY FLAT, AND HAVE SOMEONE HOLD PRESSURE OVER THE SITE FOR 20 MINUTES.IF UNABLE TO STOP, CALL [phone].    When you should NOTIFY YOUR PHYSICIAN:  - If you have shortness of breath or a persistent cough  - If you have chest pain (angina)  - If you have persistent pain at the procedure site  - If  you experience a fever with a temperature >101 degrees, chills, infection (redness, swelling, thick yellow drainage, or a foul odor from procedure site)    Other:  - You may resume your present diet, unless otherwise directed by your physician  - You may resume all of your medications as prescribed, unless otherwise directed by your physician. A list of your medications was provided to you at discharge.  - Please call your physician's office for a follow-up appointment. You should be seen in 2-4 weeks.  - Do not make any personal/business decisions and/or sign any legal documents for the next 24 hours.

## 2025-04-17 NOTE — ANESTHESIA PROCEDURE NOTES
Airway  Date/Time: 4/17/2025 1:55 PM  Reason: elective    Airway not difficult    General Information and Staff   Patient location during procedure: OR  Anesthesiologist: Delbert Heck MD  Performed: anesthesiologist   Performed by: Delbert Heck MD  Authorized by: Delbert Heck MD        Indications and Patient Condition  Indications for airway management: anesthesia  Sedation level: deep      Preoxygenated: yesPatient position: sniffing    Mask difficulty assessment: 1 - vent by mask    Final Airway Details    Final airway type: endotracheal airway    Successful airway: ETT  Cuffed: yes   Successful intubation technique: Video laryngoscopy  Endotracheal tube insertion site: oral  Blade: GlideScope  Blade size: #3  ETT size (mm): 8.0    Placement verified by: capnometry   Cuff volume (mL): 8  Measured from: lips  ETT to lips (cm): 23  Number of attempts at approach: 1    Additional Comments  atraumatic

## 2025-04-17 NOTE — PROCEDURES
Procedures performed:  1. Comprehensive EP study with 3-D mapping using CARTO  2. CS catheter placement to record and pace the left atrium.   3. PFA of atrial fibrillation with pulmonary vein isolation, posterior wall isolation.   4. Intra-cardiac echo (ICE)    : Shahram Colon MD    Pre-Op: persistent atrial fibrillation.   Post-Op: sinus rhythm    Complication: none    Methods: The patient was brought to the EP lab in a fasting state and non-sedated state. The right and left groins were prepped and draped in a sterile fashion. 3 sheaths were placed in the right femoral marian via the modified Seldinger technique. Catheters were placed in the appropriate position under ICE and 3D mapping. Baseline measurements were recorded and programmed stimulation from the atrium and ventricle was performed. At the conclusion of the procedure, catheters and sheaths were removed and hemostasis was achieved with Vascade closure devices. There were no apparent intra-operative complications no pericardial effusion visualized by ICE. The patient was transported in stable condition to recovery.        Mapping and ablation: A heparin bolus was given, intermittent boluses were subsequently given to maintain an ACT of at least 350 seconds. There was no effusion at baseline. The TAMIKO was visualized with ICE, there did not appear to be a thrombus in the TAMIKO. Transseptal (TS) access was achieved with ICE guidance.    Using the CARTO mapping system and ICE, a multi-electrode catheter was used to create a 3D geometry of the LA.     Using a PFA catheter, ablation lesions were applied to achieve pulmonary vein isolation (PVI). PVI was confirmed with differential pacing.     Posterior wall isolation was performed and confirmed with differential pacing.     Catheters were withdrawn to the RA. A linear set of lesions was delivered in the cavo-tricuspid isthmus until bi-directional block was confirmed.     Post ablation findings:   SCL 1000 ms,  AV pacing.     ICD tachy settings programmed back on.         CONCLUSIONS:  1. Sinus node function normal  2. AV node function normal.  3. His Purkinge normal  4. Status post successful pulmonary vein isolation.   5. Status post successful LA posterior wall isolation    6. No pericardial effusion visualized by ICE.    Shahram Colon MD  [facility]  Cardiac [facility]rophysiol[facility]  [phone]

## 2025-04-17 NOTE — PROGRESS NOTES
Pt received from Ambar CLARK RN around 1745. Right groin dressing cdi, soft. Pt wife at bedside. After recovery  pt out of bed to bathroom. Pt urinated. Pt ambulated in room. Right groin dressing remained cdi, soft. IV's removed and pt dressed. Pt discharged to [facility] via wheelchair by volunteer. Pt left with belongings. Pt wife drove pt home.